# Patient Record
Sex: MALE | Race: WHITE | URBAN - METROPOLITAN AREA
[De-identification: names, ages, dates, MRNs, and addresses within clinical notes are randomized per-mention and may not be internally consistent; named-entity substitution may affect disease eponyms.]

---

## 2021-03-25 ENCOUNTER — IMMUNIZATION (OUTPATIENT)
Dept: PRIMARY CARE CLINIC | Age: 46
End: 2021-03-25
Payer: COMMERCIAL

## 2021-03-25 PROCEDURE — 91301 COVID-19, MODERNA VACCINE 100MCG/0.5ML DOSE: CPT | Performed by: FAMILY MEDICINE

## 2021-03-25 PROCEDURE — 0011A COVID-19, MODERNA VACCINE 100MCG/0.5ML DOSE: CPT | Performed by: FAMILY MEDICINE

## 2021-04-22 ENCOUNTER — IMMUNIZATION (OUTPATIENT)
Dept: PRIMARY CARE CLINIC | Age: 46
End: 2021-04-22
Payer: COMMERCIAL

## 2021-04-22 PROCEDURE — 91301 COVID-19, MODERNA VACCINE 100MCG/0.5ML DOSE: CPT | Performed by: FAMILY MEDICINE

## 2021-04-22 PROCEDURE — 0012A PR IMM ADMN SARSCOV2 100 MCG/0.5 ML 2ND DOSE: CPT | Performed by: FAMILY MEDICINE

## 2024-07-06 ENCOUNTER — HOSPITAL ENCOUNTER (INPATIENT)
Age: 49
LOS: 2 days | Discharge: HOME | DRG: 322 | End: 2024-07-08
Attending: EMERGENCY MEDICINE | Admitting: INTERNAL MEDICINE
Payer: COMMERCIAL

## 2024-07-06 ENCOUNTER — APPOINTMENT (OUTPATIENT)
Dept: GENERAL RADIOLOGY | Age: 49
DRG: 322 | End: 2024-07-06
Payer: COMMERCIAL

## 2024-07-06 DIAGNOSIS — I21.3 ST ELEVATION MYOCARDIAL INFARCTION (STEMI), UNSPECIFIED ARTERY (HCC): Primary | ICD-10-CM

## 2024-07-06 DIAGNOSIS — I24.9 ACUTE CORONARY SYNDROME (HCC): ICD-10-CM

## 2024-07-06 DIAGNOSIS — I21.3 STEMI (ST ELEVATION MYOCARDIAL INFARCTION) (HCC): ICD-10-CM

## 2024-07-06 LAB
ALBUMIN SERPL-MCNC: 4.2 G/DL (ref 3.4–5)
ALBUMIN/GLOB SERPL: 1.3 {RATIO} (ref 1.1–2.2)
ALP SERPL-CCNC: 69 U/L (ref 40–129)
ALT SERPL-CCNC: 37 U/L (ref 10–40)
ANION GAP SERPL CALCULATED.3IONS-SCNC: 12 MMOL/L (ref 3–16)
AST SERPL-CCNC: 22 U/L (ref 15–37)
BASOPHILS # BLD: 0.1 K/UL (ref 0–0.2)
BASOPHILS NFR BLD: 1 %
BILIRUB SERPL-MCNC: 0.3 MG/DL (ref 0–1)
BUN SERPL-MCNC: 18 MG/DL (ref 7–20)
CALCIUM SERPL-MCNC: 9 MG/DL (ref 8.3–10.6)
CHLORIDE SERPL-SCNC: 104 MMOL/L (ref 99–110)
CO2 SERPL-SCNC: 22 MMOL/L (ref 21–32)
CREAT SERPL-MCNC: 0.9 MG/DL (ref 0.9–1.3)
DEPRECATED RDW RBC AUTO: 13.5 % (ref 12.4–15.4)
ECHO BSA: 2.38 M2
EOSINOPHIL # BLD: 0.2 K/UL (ref 0–0.6)
EOSINOPHIL NFR BLD: 1.9 %
GFR SERPLBLD CREATININE-BSD FMLA CKD-EPI: >90 ML/MIN/{1.73_M2}
GLUCOSE SERPL-MCNC: 105 MG/DL (ref 70–99)
HCT VFR BLD AUTO: 45.7 % (ref 40.5–52.5)
HGB BLD-MCNC: 15.5 G/DL (ref 13.5–17.5)
LYMPHOCYTES # BLD: 2.8 K/UL (ref 1–5.1)
LYMPHOCYTES NFR BLD: 27.9 %
MCH RBC QN AUTO: 28.9 PG (ref 26–34)
MCHC RBC AUTO-ENTMCNC: 33.8 G/DL (ref 31–36)
MCV RBC AUTO: 85.5 FL (ref 80–100)
MONOCYTES # BLD: 0.8 K/UL (ref 0–1.3)
MONOCYTES NFR BLD: 7.7 %
NEUTROPHILS # BLD: 6.2 K/UL (ref 1.7–7.7)
NEUTROPHILS NFR BLD: 61.5 %
NT-PROBNP SERPL-MCNC: <36 PG/ML (ref 0–124)
PLATELET # BLD AUTO: 179 K/UL (ref 135–450)
PMV BLD AUTO: 10.3 FL (ref 5–10.5)
POC ACT LR: 193 SEC
POC ACT LR: 207 SEC
POTASSIUM SERPL-SCNC: 4.6 MMOL/L (ref 3.5–5.1)
PROT SERPL-MCNC: 7.4 G/DL (ref 6.4–8.2)
RBC # BLD AUTO: 5.35 M/UL (ref 4.2–5.9)
SODIUM SERPL-SCNC: 138 MMOL/L (ref 136–145)
TROPONIN, HIGH SENSITIVITY: <6 NG/L (ref 0–22)
WBC # BLD AUTO: 10.1 K/UL (ref 4–11)

## 2024-07-06 PROCEDURE — 6360000004 HC RX CONTRAST MEDICATION: Performed by: STUDENT IN AN ORGANIZED HEALTH CARE EDUCATION/TRAINING PROGRAM

## 2024-07-06 PROCEDURE — C1894 INTRO/SHEATH, NON-LASER: HCPCS | Performed by: STUDENT IN AN ORGANIZED HEALTH CARE EDUCATION/TRAINING PROGRAM

## 2024-07-06 PROCEDURE — 93458 L HRT ARTERY/VENTRICLE ANGIO: CPT | Performed by: STUDENT IN AN ORGANIZED HEALTH CARE EDUCATION/TRAINING PROGRAM

## 2024-07-06 PROCEDURE — 027036Z DILATION OF CORONARY ARTERY, ONE ARTERY WITH THREE DRUG-ELUTING INTRALUMINAL DEVICES, PERCUTANEOUS APPROACH: ICD-10-PCS | Performed by: STUDENT IN AN ORGANIZED HEALTH CARE EDUCATION/TRAINING PROGRAM

## 2024-07-06 PROCEDURE — 96374 THER/PROPH/DIAG INJ IV PUSH: CPT

## 2024-07-06 PROCEDURE — 80061 LIPID PANEL: CPT

## 2024-07-06 PROCEDURE — 2000000000 HC ICU R&B

## 2024-07-06 PROCEDURE — 2500000003 HC RX 250 WO HCPCS: Performed by: STUDENT IN AN ORGANIZED HEALTH CARE EDUCATION/TRAINING PROGRAM

## 2024-07-06 PROCEDURE — 99152 MOD SED SAME PHYS/QHP 5/>YRS: CPT | Performed by: STUDENT IN AN ORGANIZED HEALTH CARE EDUCATION/TRAINING PROGRAM

## 2024-07-06 PROCEDURE — 83880 ASSAY OF NATRIURETIC PEPTIDE: CPT

## 2024-07-06 PROCEDURE — 71045 X-RAY EXAM CHEST 1 VIEW: CPT

## 2024-07-06 PROCEDURE — 6360000002 HC RX W HCPCS: Performed by: STUDENT IN AN ORGANIZED HEALTH CARE EDUCATION/TRAINING PROGRAM

## 2024-07-06 PROCEDURE — 6360000002 HC RX W HCPCS

## 2024-07-06 PROCEDURE — 99285 EMERGENCY DEPT VISIT HI MDM: CPT

## 2024-07-06 PROCEDURE — 36415 COLL VENOUS BLD VENIPUNCTURE: CPT

## 2024-07-06 PROCEDURE — C1725 CATH, TRANSLUMIN NON-LASER: HCPCS | Performed by: STUDENT IN AN ORGANIZED HEALTH CARE EDUCATION/TRAINING PROGRAM

## 2024-07-06 PROCEDURE — 96375 TX/PRO/DX INJ NEW DRUG ADDON: CPT

## 2024-07-06 PROCEDURE — 6370000000 HC RX 637 (ALT 250 FOR IP): Performed by: STUDENT IN AN ORGANIZED HEALTH CARE EDUCATION/TRAINING PROGRAM

## 2024-07-06 PROCEDURE — 85025 COMPLETE CBC W/AUTO DIFF WBC: CPT

## 2024-07-06 PROCEDURE — 80053 COMPREHEN METABOLIC PANEL: CPT

## 2024-07-06 PROCEDURE — B2151ZZ FLUOROSCOPY OF LEFT HEART USING LOW OSMOLAR CONTRAST: ICD-10-PCS | Performed by: STUDENT IN AN ORGANIZED HEALTH CARE EDUCATION/TRAINING PROGRAM

## 2024-07-06 PROCEDURE — 84484 ASSAY OF TROPONIN QUANT: CPT

## 2024-07-06 PROCEDURE — C9606 PERC D-E COR REVASC W AMI S: HCPCS | Performed by: STUDENT IN AN ORGANIZED HEALTH CARE EDUCATION/TRAINING PROGRAM

## 2024-07-06 PROCEDURE — 3E033XZ INTRODUCTION OF VASOPRESSOR INTO PERIPHERAL VEIN, PERCUTANEOUS APPROACH: ICD-10-PCS | Performed by: STUDENT IN AN ORGANIZED HEALTH CARE EDUCATION/TRAINING PROGRAM

## 2024-07-06 PROCEDURE — 99153 MOD SED SAME PHYS/QHP EA: CPT | Performed by: STUDENT IN AN ORGANIZED HEALTH CARE EDUCATION/TRAINING PROGRAM

## 2024-07-06 PROCEDURE — C1769 GUIDE WIRE: HCPCS | Performed by: STUDENT IN AN ORGANIZED HEALTH CARE EDUCATION/TRAINING PROGRAM

## 2024-07-06 PROCEDURE — 6360000002 HC RX W HCPCS: Performed by: EMERGENCY MEDICINE

## 2024-07-06 PROCEDURE — 2580000003 HC RX 258: Performed by: NURSE PRACTITIONER

## 2024-07-06 PROCEDURE — 2580000003 HC RX 258: Performed by: STUDENT IN AN ORGANIZED HEALTH CARE EDUCATION/TRAINING PROGRAM

## 2024-07-06 PROCEDURE — 2580000003 HC RX 258

## 2024-07-06 PROCEDURE — 2709999900 HC NON-CHARGEABLE SUPPLY: Performed by: STUDENT IN AN ORGANIZED HEALTH CARE EDUCATION/TRAINING PROGRAM

## 2024-07-06 PROCEDURE — 93005 ELECTROCARDIOGRAM TRACING: CPT | Performed by: EMERGENCY MEDICINE

## 2024-07-06 PROCEDURE — 85347 COAGULATION TIME ACTIVATED: CPT

## 2024-07-06 PROCEDURE — 83036 HEMOGLOBIN GLYCOSYLATED A1C: CPT

## 2024-07-06 PROCEDURE — C1874 STENT, COATED/COV W/DEL SYS: HCPCS | Performed by: STUDENT IN AN ORGANIZED HEALTH CARE EDUCATION/TRAINING PROGRAM

## 2024-07-06 PROCEDURE — C1887 CATHETER, GUIDING: HCPCS | Performed by: STUDENT IN AN ORGANIZED HEALTH CARE EDUCATION/TRAINING PROGRAM

## 2024-07-06 PROCEDURE — 6360000002 HC RX W HCPCS: Performed by: NURSE PRACTITIONER

## 2024-07-06 PROCEDURE — 6370000000 HC RX 637 (ALT 250 FOR IP): Performed by: EMERGENCY MEDICINE

## 2024-07-06 PROCEDURE — 92941 PRQ TRLML REVSC TOT OCCL AMI: CPT | Performed by: STUDENT IN AN ORGANIZED HEALTH CARE EDUCATION/TRAINING PROGRAM

## 2024-07-06 PROCEDURE — 4A023N7 MEASUREMENT OF CARDIAC SAMPLING AND PRESSURE, LEFT HEART, PERCUTANEOUS APPROACH: ICD-10-PCS | Performed by: STUDENT IN AN ORGANIZED HEALTH CARE EDUCATION/TRAINING PROGRAM

## 2024-07-06 PROCEDURE — 99222 1ST HOSP IP/OBS MODERATE 55: CPT | Performed by: STUDENT IN AN ORGANIZED HEALTH CARE EDUCATION/TRAINING PROGRAM

## 2024-07-06 PROCEDURE — 2500000003 HC RX 250 WO HCPCS

## 2024-07-06 PROCEDURE — B2111ZZ FLUOROSCOPY OF MULTIPLE CORONARY ARTERIES USING LOW OSMOLAR CONTRAST: ICD-10-PCS | Performed by: STUDENT IN AN ORGANIZED HEALTH CARE EDUCATION/TRAINING PROGRAM

## 2024-07-06 DEVICE — STENT ONYXNG30038UX ONYX 3.00X38RX
Type: IMPLANTABLE DEVICE | Status: FUNCTIONAL
Brand: ONYX FRONTIER™

## 2024-07-06 DEVICE — STENT ONYXNG35038UX ONYX 3.50X38RX
Type: IMPLANTABLE DEVICE | Status: FUNCTIONAL
Brand: ONYX FRONTIER™

## 2024-07-06 DEVICE — STENT ONYXNG27538UX ONYX 2.75X38RX
Type: IMPLANTABLE DEVICE | Status: FUNCTIONAL
Brand: ONYX FRONTIER™

## 2024-07-06 RX ORDER — ATROPINE SULFATE 0.1 MG/ML
INJECTION INTRAVENOUS PRN
Status: DISCONTINUED | OUTPATIENT
Start: 2024-07-06 | End: 2024-07-06 | Stop reason: HOSPADM

## 2024-07-06 RX ORDER — ONDANSETRON 2 MG/ML
4 INJECTION INTRAMUSCULAR; INTRAVENOUS EVERY 6 HOURS PRN
Status: DISCONTINUED | OUTPATIENT
Start: 2024-07-06 | End: 2024-07-08 | Stop reason: HOSPADM

## 2024-07-06 RX ORDER — ROSUVASTATIN CALCIUM 10 MG/1
20 TABLET, COATED ORAL NIGHTLY
Status: DISCONTINUED | OUTPATIENT
Start: 2024-07-06 | End: 2024-07-08 | Stop reason: HOSPADM

## 2024-07-06 RX ORDER — MAGNESIUM SULFATE IN WATER 40 MG/ML
2000 INJECTION, SOLUTION INTRAVENOUS PRN
Status: DISCONTINUED | OUTPATIENT
Start: 2024-07-06 | End: 2024-07-08 | Stop reason: HOSPADM

## 2024-07-06 RX ORDER — ASPIRIN 81 MG/1
81 TABLET, CHEWABLE ORAL DAILY
Status: DISCONTINUED | OUTPATIENT
Start: 2024-07-06 | End: 2024-07-08 | Stop reason: HOSPADM

## 2024-07-06 RX ORDER — SODIUM CHLORIDE 9 MG/ML
INJECTION, SOLUTION INTRAVENOUS CONTINUOUS
Status: ACTIVE | OUTPATIENT
Start: 2024-07-06 | End: 2024-07-07

## 2024-07-06 RX ORDER — ENOXAPARIN SODIUM 100 MG/ML
30 INJECTION SUBCUTANEOUS 2 TIMES DAILY
Status: DISCONTINUED | OUTPATIENT
Start: 2024-07-06 | End: 2024-07-08 | Stop reason: HOSPADM

## 2024-07-06 RX ORDER — METOPROLOL SUCCINATE 25 MG/1
25 TABLET, EXTENDED RELEASE ORAL DAILY
Status: DISCONTINUED | OUTPATIENT
Start: 2024-07-06 | End: 2024-07-08 | Stop reason: HOSPADM

## 2024-07-06 RX ORDER — SODIUM CHLORIDE 0.9 % (FLUSH) 0.9 %
5-40 SYRINGE (ML) INJECTION PRN
Status: DISCONTINUED | OUTPATIENT
Start: 2024-07-06 | End: 2024-07-08

## 2024-07-06 RX ORDER — SODIUM CHLORIDE 0.9 % (FLUSH) 0.9 %
5-40 SYRINGE (ML) INJECTION EVERY 12 HOURS SCHEDULED
Status: DISCONTINUED | OUTPATIENT
Start: 2024-07-06 | End: 2024-07-08 | Stop reason: HOSPADM

## 2024-07-06 RX ORDER — SODIUM CHLORIDE 9 MG/ML
INJECTION, SOLUTION INTRAVENOUS PRN
Status: DISCONTINUED | OUTPATIENT
Start: 2024-07-06 | End: 2024-07-08 | Stop reason: HOSPADM

## 2024-07-06 RX ORDER — POLYETHYLENE GLYCOL 3350 17 G/17G
17 POWDER, FOR SOLUTION ORAL DAILY PRN
Status: DISCONTINUED | OUTPATIENT
Start: 2024-07-06 | End: 2024-07-08 | Stop reason: HOSPADM

## 2024-07-06 RX ORDER — POTASSIUM CHLORIDE 7.45 MG/ML
10 INJECTION INTRAVENOUS PRN
Status: DISCONTINUED | OUTPATIENT
Start: 2024-07-06 | End: 2024-07-08 | Stop reason: HOSPADM

## 2024-07-06 RX ORDER — NITROGLYCERIN 20 MG/100ML
INJECTION INTRAVENOUS CONTINUOUS PRN
Status: COMPLETED | OUTPATIENT
Start: 2024-07-06 | End: 2024-07-06

## 2024-07-06 RX ORDER — ACETAMINOPHEN 325 MG/1
650 TABLET ORAL EVERY 6 HOURS PRN
Status: DISCONTINUED | OUTPATIENT
Start: 2024-07-06 | End: 2024-07-08

## 2024-07-06 RX ORDER — VALSARTAN 80 MG/1
40 TABLET ORAL DAILY
Status: DISCONTINUED | OUTPATIENT
Start: 2024-07-07 | End: 2024-07-08

## 2024-07-06 RX ORDER — ACETAMINOPHEN 650 MG/1
650 SUPPOSITORY RECTAL EVERY 6 HOURS PRN
Status: DISCONTINUED | OUTPATIENT
Start: 2024-07-06 | End: 2024-07-08

## 2024-07-06 RX ORDER — MORPHINE SULFATE 4 MG/ML
4 INJECTION, SOLUTION INTRAMUSCULAR; INTRAVENOUS ONCE
Status: COMPLETED | OUTPATIENT
Start: 2024-07-06 | End: 2024-07-06

## 2024-07-06 RX ORDER — SODIUM CHLORIDE 9 MG/ML
INJECTION, SOLUTION INTRAVENOUS PRN
Status: DISCONTINUED | OUTPATIENT
Start: 2024-07-06 | End: 2024-07-08

## 2024-07-06 RX ORDER — POTASSIUM CHLORIDE 20 MEQ/1
40 TABLET, EXTENDED RELEASE ORAL PRN
Status: DISCONTINUED | OUTPATIENT
Start: 2024-07-06 | End: 2024-07-08 | Stop reason: HOSPADM

## 2024-07-06 RX ORDER — ACETAMINOPHEN 325 MG/1
650 TABLET ORAL EVERY 4 HOURS PRN
Status: DISCONTINUED | OUTPATIENT
Start: 2024-07-06 | End: 2024-07-08 | Stop reason: HOSPADM

## 2024-07-06 RX ORDER — MIDAZOLAM HYDROCHLORIDE 1 MG/ML
INJECTION INTRAMUSCULAR; INTRAVENOUS PRN
Status: DISCONTINUED | OUTPATIENT
Start: 2024-07-06 | End: 2024-07-06 | Stop reason: HOSPADM

## 2024-07-06 RX ORDER — VERAPAMIL HYDROCHLORIDE 2.5 MG/ML
INJECTION, SOLUTION INTRAVENOUS PRN
Status: DISCONTINUED | OUTPATIENT
Start: 2024-07-06 | End: 2024-07-06 | Stop reason: HOSPADM

## 2024-07-06 RX ORDER — SODIUM CHLORIDE 0.9 % (FLUSH) 0.9 %
5-40 SYRINGE (ML) INJECTION PRN
Status: DISCONTINUED | OUTPATIENT
Start: 2024-07-06 | End: 2024-07-08 | Stop reason: HOSPADM

## 2024-07-06 RX ORDER — HEPARIN SODIUM 1000 [USP'U]/ML
4000 INJECTION, SOLUTION INTRAVENOUS; SUBCUTANEOUS ONCE
Status: COMPLETED | OUTPATIENT
Start: 2024-07-06 | End: 2024-07-06

## 2024-07-06 RX ORDER — HEPARIN SODIUM 1000 [USP'U]/ML
INJECTION, SOLUTION INTRAVENOUS; SUBCUTANEOUS PRN
Status: DISCONTINUED | OUTPATIENT
Start: 2024-07-06 | End: 2024-07-06 | Stop reason: HOSPADM

## 2024-07-06 RX ORDER — SODIUM CHLORIDE 0.9 % (FLUSH) 0.9 %
5-40 SYRINGE (ML) INJECTION EVERY 12 HOURS SCHEDULED
Status: DISCONTINUED | OUTPATIENT
Start: 2024-07-06 | End: 2024-07-08

## 2024-07-06 RX ORDER — ONDANSETRON 4 MG/1
4 TABLET, ORALLY DISINTEGRATING ORAL EVERY 8 HOURS PRN
Status: DISCONTINUED | OUTPATIENT
Start: 2024-07-06 | End: 2024-07-08 | Stop reason: HOSPADM

## 2024-07-06 RX ORDER — FENTANYL CITRATE 50 UG/ML
INJECTION, SOLUTION INTRAMUSCULAR; INTRAVENOUS PRN
Status: DISCONTINUED | OUTPATIENT
Start: 2024-07-06 | End: 2024-07-06 | Stop reason: HOSPADM

## 2024-07-06 RX ADMIN — TICAGRELOR 180 MG: 90 TABLET ORAL at 18:31

## 2024-07-06 RX ADMIN — TICAGRELOR 90 MG: 90 TABLET ORAL at 21:49

## 2024-07-06 RX ADMIN — HEPARIN SODIUM 4000 UNITS: 1000 INJECTION INTRAVENOUS; SUBCUTANEOUS at 18:28

## 2024-07-06 RX ADMIN — ROSUVASTATIN 20 MG: 10 TABLET, FILM COATED ORAL at 21:49

## 2024-07-06 RX ADMIN — ASPIRIN 81 MG 81 MG: 81 TABLET ORAL at 21:49

## 2024-07-06 RX ADMIN — MORPHINE SULFATE 4 MG: 4 INJECTION, SOLUTION INTRAMUSCULAR; INTRAVENOUS at 18:27

## 2024-07-06 RX ADMIN — ENOXAPARIN SODIUM 30 MG: 100 INJECTION SUBCUTANEOUS at 21:49

## 2024-07-06 RX ADMIN — SODIUM CHLORIDE, PRESERVATIVE FREE 10 ML: 5 INJECTION INTRAVENOUS at 22:01

## 2024-07-06 RX ADMIN — METOPROLOL SUCCINATE 25 MG: 25 TABLET, EXTENDED RELEASE ORAL at 21:49

## 2024-07-06 RX ADMIN — SODIUM CHLORIDE: 9 INJECTION, SOLUTION INTRAVENOUS at 19:50

## 2024-07-06 RX ADMIN — SODIUM CHLORIDE: 9 INJECTION, SOLUTION INTRAVENOUS at 23:19

## 2024-07-06 ASSESSMENT — PAIN SCALES - GENERAL: PAINLEVEL_OUTOF10: 0

## 2024-07-06 NOTE — CONSULTS
[facility] HEART [place]    2024    Arthur Klein (:  1975) is a 48 y.o. male    Referring Provider: No primary care provider on file.    HISTORY:  48M With no reported past medical history and has felt really quite well is admitted with field activated STEMI.  Has had no prior chest pain or any warning symptoms over the past few days.  He is working describes today when he felt the acute onset of chest discomfort and pressure.  This was associated with radiation to his shoulders.He subsequently got quite diaphoretic and the pain persisted and therefore he called EMS.  EMS arrived and noted inferior ST elevations with lateral depressions and field activated STEMI was called.  He was seen in the emergency room where chest pain persist.  He has been given aspirin and ticagrelor.  Currently rates it 2-4 out of 10 and improved after morphine.    Discussed emergent left heart catheterization for which she is ready to proceed.      REVIEW OF SYSTEMS:  Review of systems was deferred due to acuity of present illness    Prior to Visit Medications    Not on File        No Known Allergies    No past medical history on file.    No past surgical history on file.    Social History     Tobacco Use    Smoking status: Never    Smokeless tobacco: Never   Substance Use Topics    Alcohol use: Yes     Comment: socially        No family history on file.    PHYSICAL EXAMINATION:  Vitals:    24 1819 24 1826 24 1834   BP:  (!) 147/98    Pulse:  58    Resp:  21    Temp:   97.8 °F (36.6 °C)   TempSrc:   Axillary   SpO2:  97%    Weight: 109.8 kg (242 lb)     Height: 1.854 m (6' 1\")       Estimated body mass index is 31.93 kg/m² as calculated from the following:    Height as of this encounter: 1.854 m (6' 1\").    Weight as of this encounter: 109.8 kg (242 lb).    General Appearance: Uncomfortable  Eyes:  Conjunctiva clear  Pupils equal, round, reactive to light  ENT:  External Ears and Nose unremarkable  Oral mucosa

## 2024-07-06 NOTE — SEDATION DOCUMENTATION
Brief Pre-Op Note/Sedation Assessment      Arthur Klein  1975  [id number]  6:41 PM    Planned Procedure: Cardiac Catheterization Procedure  Post Procedure Plan: Return to same level of care  Consent: I have discussed with the patient and/or the patient representative the indication, alternatives, and the possible risks and/or complications of the planned procedure and the anesthesia methods. The patient and/or patient representative appear to understand and agree to proceed.        Chief Complaint:   Chest Pain/Pressure  STEMI      Indications for Cath Procedure:  Presentation:  ACS <= 24 hrs  2.  Anginal Classification within 2 weeks:  CCS IV - Inability to perform any activity without angina or angina at rest, i.e., severe limitation  3.  Angina Symptoms Assessment:  Typical Chest Pain  4.  Heart Failure Class within last 2 weeks:  No symptoms  5.  Cardiovascular Instability:  No    Prior Ischemic Workup/Eval:  Pre-Procedural Medications: ASA/Ticagrelor  2.   Stress Test Completed?  No    Does Patient need surgery?  Cath Valve Surgery:  No    Pre-Procedure Medical History:  Vital Signs:  BP (!) 147/98   Pulse 58   Temp 97.8 °F (36.6 °C) (Axillary)   Resp 21   Ht 1.854 m (6' 1\")   Wt 109.8 kg (242 lb)   SpO2 97%   BMI 31.93 kg/m²     Allergies:  No Known Allergies  Medications:    No current facility-administered medications for this encounter.     No current outpatient medications on file.       Past Medical History:  No past medical history on file.    Surgical History:  No past surgical history on file.          Pre-Sedation:  Pre-Sedation Documentation and Exam:  I have personally completed a history, physical exam & review of systems for this patient (see notes).    Prior History of Anesthesia Complications:   none    Modified Mallampati:  III (soft palate, base of uvula visible)    ASA Classification:  Class 3 - A patient with severe systemic disease that limits activity but is not

## 2024-07-06 NOTE — ED NOTES
Code Stemi  Prior pt getting here,   @1755 Called cardio   @1755 Called clinical and spoke to Suzi to call in Cath lab  @1757 Andi Villanueva cardio called back told him the pt wasn't here yet  @1814 Andi Villanueva called and said he was on his way, told him the pt just arrived  @1815 called clinical and told Suzi pt just arrived   @1817 Cath lab called and said they were on the way  @1823 Andi called back and spoke to , cardio will be here in 5 to 10min

## 2024-07-06 NOTE — ED PROVIDER NOTES
EMERGENCY DEPARTMENT ENCOUNTER     [facility] CATH LAB     Pt Name: Arthur Klein   MRN: [id number]   Birthdate 1975   Date of evaluation: 7/6/2024   Provider: Napoleon Hernández MD   PCP: No primary care provider on file.   Note Started: 8:09 PM EDT 7/6/24     CHIEF COMPLAINT     Chief Complaint   Patient presents with    Chest Pain     Was working in garage around 1720 and started having left sided chest pain/pressure with left arm numbness/tingling in both arms. Pt appears clammy upon arrival, states 2/10 chest pain currently. Denies cardiac hx. EMS gave 324 ASA prior to arrival         HISTORY OF PRESENT ILLNESS:  History from : Patient   Limitations to history : None     Arthur Klein is a 48 y.o. male who presents for chest pain.  Patient reports he was working in the garage when he became sweaty and had tingling in both arms and felt a chest pain that is about 4 out of 10 pressure in the mid chest.  After 10 or 15 minutes and the symptoms did not resolve he called EMS.  EMS EKG revealed ST elevation MI and they transported him here and gave aspirin en route.  Patient received 324 mg of aspirin prior to arrival.  No prior cardiac history or any significant medical problems.  Patient denies any family history of early heart disease.  Pain is improved to 2 out of 10 by the time of arrival here.    Nursing Notes were all reviewed and agreed with or any disagreements were addressed in the HPI.     ROS: Positives and Pertinent negatives as per HPI.    PAST MEDICAL HISTORY     Past medical history:  has no past medical history on file.    Past surgical history:  has no past surgical history on file.    Med list:   No current facility-administered medications on file prior to encounter.     No current outpatient medications on file prior to encounter.       PHYSICAL EXAM:  ED Triage Vitals   BP Temp Temp Source Pulse Respirations SpO2 Height Weight - Scale   07/06/24 1826 07/06/24 1834 07/06/24

## 2024-07-07 LAB
ALBUMIN SERPL-MCNC: 3.5 G/DL (ref 3.4–5)
ALBUMIN/GLOB SERPL: 1.4 {RATIO} (ref 1.1–2.2)
ALP SERPL-CCNC: 59 U/L (ref 40–129)
ALT SERPL-CCNC: 32 U/L (ref 10–40)
ANION GAP SERPL CALCULATED.3IONS-SCNC: 8 MMOL/L (ref 3–16)
ANION GAP SERPL CALCULATED.3IONS-SCNC: 9 MMOL/L (ref 3–16)
AST SERPL-CCNC: 45 U/L (ref 15–37)
BASOPHILS # BLD: 0.1 K/UL (ref 0–0.2)
BASOPHILS NFR BLD: 0.8 %
BILIRUB SERPL-MCNC: 0.3 MG/DL (ref 0–1)
BUN SERPL-MCNC: 14 MG/DL (ref 7–20)
BUN SERPL-MCNC: 14 MG/DL (ref 7–20)
CALCIUM SERPL-MCNC: 7.9 MG/DL (ref 8.3–10.6)
CALCIUM SERPL-MCNC: 7.9 MG/DL (ref 8.3–10.6)
CHLORIDE SERPL-SCNC: 106 MMOL/L (ref 99–110)
CHLORIDE SERPL-SCNC: 107 MMOL/L (ref 99–110)
CHOLEST SERPL-MCNC: 198 MG/DL (ref 0–199)
CO2 SERPL-SCNC: 23 MMOL/L (ref 21–32)
CO2 SERPL-SCNC: 24 MMOL/L (ref 21–32)
CREAT SERPL-MCNC: 0.6 MG/DL (ref 0.9–1.3)
CREAT SERPL-MCNC: 0.8 MG/DL (ref 0.9–1.3)
DEPRECATED RDW RBC AUTO: 13.4 % (ref 12.4–15.4)
EKG ATRIAL RATE: 61 BPM
EKG ATRIAL RATE: 68 BPM
EKG DIAGNOSIS: NORMAL
EKG DIAGNOSIS: NORMAL
EKG P AXIS: -10 DEGREES
EKG P AXIS: 29 DEGREES
EKG P-R INTERVAL: 142 MS
EKG P-R INTERVAL: 150 MS
EKG Q-T INTERVAL: 396 MS
EKG Q-T INTERVAL: 400 MS
EKG QRS DURATION: 92 MS
EKG QRS DURATION: 98 MS
EKG QTC CALCULATION (BAZETT): 402 MS
EKG QTC CALCULATION (BAZETT): 421 MS
EKG R AXIS: -6 DEGREES
EKG R AXIS: 28 DEGREES
EKG T AXIS: -22 DEGREES
EKG T AXIS: 64 DEGREES
EKG VENTRICULAR RATE: 61 BPM
EKG VENTRICULAR RATE: 68 BPM
EOSINOPHIL # BLD: 0.1 K/UL (ref 0–0.6)
EOSINOPHIL NFR BLD: 0.9 %
EST. AVERAGE GLUCOSE BLD GHB EST-MCNC: 99.7 MG/DL
GFR SERPLBLD CREATININE-BSD FMLA CKD-EPI: >90 ML/MIN/{1.73_M2}
GFR SERPLBLD CREATININE-BSD FMLA CKD-EPI: >90 ML/MIN/{1.73_M2}
GLUCOSE SERPL-MCNC: 105 MG/DL (ref 70–99)
GLUCOSE SERPL-MCNC: 118 MG/DL (ref 70–99)
HBA1C MFR BLD: 5.1 %
HCT VFR BLD AUTO: 39.7 % (ref 40.5–52.5)
HDLC SERPL-MCNC: 37 MG/DL (ref 40–60)
HGB BLD-MCNC: 13.7 G/DL (ref 13.5–17.5)
LDLC SERPL CALC-MCNC: 116 MG/DL
LYMPHOCYTES # BLD: 1.7 K/UL (ref 1–5.1)
LYMPHOCYTES NFR BLD: 20.3 %
MCH RBC QN AUTO: 29.8 PG (ref 26–34)
MCHC RBC AUTO-ENTMCNC: 34.7 G/DL (ref 31–36)
MCV RBC AUTO: 85.9 FL (ref 80–100)
MONOCYTES # BLD: 0.5 K/UL (ref 0–1.3)
MONOCYTES NFR BLD: 6.4 %
NEUTROPHILS # BLD: 5.8 K/UL (ref 1.7–7.7)
NEUTROPHILS NFR BLD: 71.6 %
PLATELET # BLD AUTO: 146 K/UL (ref 135–450)
PMV BLD AUTO: 9.8 FL (ref 5–10.5)
POTASSIUM SERPL-SCNC: 3.9 MMOL/L (ref 3.5–5.1)
POTASSIUM SERPL-SCNC: 4 MMOL/L (ref 3.5–5.1)
PROT SERPL-MCNC: 6 G/DL (ref 6.4–8.2)
RBC # BLD AUTO: 4.62 M/UL (ref 4.2–5.9)
SODIUM SERPL-SCNC: 138 MMOL/L (ref 136–145)
SODIUM SERPL-SCNC: 139 MMOL/L (ref 136–145)
TRIGL SERPL-MCNC: 223 MG/DL (ref 0–150)
TROPONIN, HIGH SENSITIVITY: 752 NG/L (ref 0–22)
TROPONIN, HIGH SENSITIVITY: 841 NG/L (ref 0–22)
VLDLC SERPL CALC-MCNC: 45 MG/DL
WBC # BLD AUTO: 8.2 K/UL (ref 4–11)

## 2024-07-07 PROCEDURE — 2580000003 HC RX 258: Performed by: NURSE PRACTITIONER

## 2024-07-07 PROCEDURE — 2000000000 HC ICU R&B

## 2024-07-07 PROCEDURE — 36415 COLL VENOUS BLD VENIPUNCTURE: CPT

## 2024-07-07 PROCEDURE — 6360000002 HC RX W HCPCS: Performed by: NURSE PRACTITIONER

## 2024-07-07 PROCEDURE — 84484 ASSAY OF TROPONIN QUANT: CPT

## 2024-07-07 PROCEDURE — 6370000000 HC RX 637 (ALT 250 FOR IP): Performed by: STUDENT IN AN ORGANIZED HEALTH CARE EDUCATION/TRAINING PROGRAM

## 2024-07-07 PROCEDURE — 93005 ELECTROCARDIOGRAM TRACING: CPT | Performed by: STUDENT IN AN ORGANIZED HEALTH CARE EDUCATION/TRAINING PROGRAM

## 2024-07-07 PROCEDURE — 93010 ELECTROCARDIOGRAM REPORT: CPT | Performed by: INTERNAL MEDICINE

## 2024-07-07 PROCEDURE — 99232 SBSQ HOSP IP/OBS MODERATE 35: CPT | Performed by: INTERNAL MEDICINE

## 2024-07-07 PROCEDURE — 85025 COMPLETE CBC W/AUTO DIFF WBC: CPT

## 2024-07-07 PROCEDURE — 80053 COMPREHEN METABOLIC PANEL: CPT

## 2024-07-07 RX ADMIN — ROSUVASTATIN 20 MG: 10 TABLET, FILM COATED ORAL at 20:57

## 2024-07-07 RX ADMIN — TICAGRELOR 90 MG: 90 TABLET ORAL at 20:57

## 2024-07-07 RX ADMIN — ENOXAPARIN SODIUM 30 MG: 100 INJECTION SUBCUTANEOUS at 09:14

## 2024-07-07 RX ADMIN — SODIUM CHLORIDE, PRESERVATIVE FREE 10 ML: 5 INJECTION INTRAVENOUS at 20:00

## 2024-07-07 RX ADMIN — METOPROLOL SUCCINATE 25 MG: 25 TABLET, EXTENDED RELEASE ORAL at 09:18

## 2024-07-07 RX ADMIN — TICAGRELOR 90 MG: 90 TABLET ORAL at 09:18

## 2024-07-07 RX ADMIN — ASPIRIN 81 MG 81 MG: 81 TABLET ORAL at 09:14

## 2024-07-07 RX ADMIN — SODIUM CHLORIDE, PRESERVATIVE FREE 10 ML: 5 INJECTION INTRAVENOUS at 09:18

## 2024-07-07 RX ADMIN — VALSARTAN 40 MG: 80 TABLET, FILM COATED ORAL at 09:14

## 2024-07-07 NOTE — H&P
V2.0  History and Physical      Name:  Arthur Klein /Age/Sex: 1975  (48 y.o. male)   MRN & CSN:  [id number] & [id number] Encounter Date/Time: 2024 8:27 PM EDT   Location:  Cath Pool Room/ PCP: No primary care provider on file.       Hospital Day: 1    Assessment and Plan:   Arthur Klein is a 48 y.o. male with no significant past medical history who presents with STEMI (ST elevation myocardial infarction) ([facility])    Hospital Problems             Last Modified POA    * (Principal) STEMI (ST elevation myocardial infarction) ([facility]) 2024 Unknown       Plan:  STEMI  -pt now s/p PCI  -ASA and tricagrelor load in the ED  -cardiology following  -telemetry monitor  -metoprolol, crestor, valsartan started per cardio  -cont ASA  -lipid panel pending  -Hgb A1C pending  -repeat labs in AM  -cardiac rehab per cardiology  -EKG in AM  -echo in AM    Disposition:   Current Living situation: home   Expected Disposition: home  Estimated D/C: 24    Diet ADULT DIET; Regular; Low Fat/Low Chol/High Fiber/2 gm Na   DVT Prophylaxis [] Lovenox, []  Heparin, [] SCDs, [] Ambulation,  [] Eliquis, [] Xarelto, [] Coumadin   Code Status Full Code   Surrogate Decision Maker/ ARNOLDO Fuentes Kleinbryant     Personally reviewed Lab Studies and Imaging       Drugs that require monitoring for toxicity include ASA, antiplatelets and the method of monitoring was daily CBC        History from:     patient    History of Present Illness:     Chief Complaint: chest pressure  Arthur Klein is a 48 y.o. male with no significant past medical history who presents with STEMI. Activated in the field. Patient reports he had chest pressure associated with bilateral arm tingling and discomfort. Patient was working at the time. No previous history of same. No recent illness including fever, chills, cough, shorntess of breath. Found to be diaphoretic thus EMS called. EMS noted inferios ST elevations with lateral depressions and field activated STEMI was

## 2024-07-07 NOTE — PLAN OF CARE
Problem: Discharge Planning  Goal: Discharge to home or other facility with appropriate resources  7/7/2024 1423 by Delores Duncan RN  Outcome: Progressing     Problem: Cardiovascular - Adult  Goal: Maintains optimal cardiac output and hemodynamic stability  Outcome: Progressing     Problem: Cardiovascular - Adult  Goal: Absence of cardiac dysrhythmias or at baseline  Outcome: Progressing     Problem: Skin/Tissue Integrity - Adult  Goal: Skin integrity remains intact  Outcome: Progressing     Problem: Hematologic - Adult  Goal: Maintains hematologic stability  Outcome: Progressing

## 2024-07-08 ENCOUNTER — APPOINTMENT (OUTPATIENT)
Age: 49
DRG: 322 | End: 2024-07-08
Attending: STUDENT IN AN ORGANIZED HEALTH CARE EDUCATION/TRAINING PROGRAM
Payer: COMMERCIAL

## 2024-07-08 VITALS
DIASTOLIC BLOOD PRESSURE: 92 MMHG | TEMPERATURE: 98.5 F | HEIGHT: 73 IN | WEIGHT: 253 LBS | HEART RATE: 81 BPM | RESPIRATION RATE: 20 BRPM | OXYGEN SATURATION: 98 % | SYSTOLIC BLOOD PRESSURE: 144 MMHG | BODY MASS INDEX: 33.53 KG/M2

## 2024-07-08 LAB
DEPRECATED RDW RBC AUTO: 13.2 % (ref 12.4–15.4)
ECHO AO ASC DIAM: 3.9 CM
ECHO AO ASCENDING AORTA INDEX: 1.64 CM/M2
ECHO AO ROOT DIAM: 3.4 CM
ECHO AO ROOT INDEX: 1.43 CM/M2
ECHO AV MEAN GRADIENT: 4 MMHG
ECHO AV MEAN GRADIENT: 4 MMHG
ECHO AV MEAN VELOCITY: 1 M/S
ECHO AV PEAK GRADIENT: 8 MMHG
ECHO AV PEAK VELOCITY: 1.5 M/S
ECHO AV VELOCITY RATIO: 0.67
ECHO AV VTI: 27.1 CM
ECHO BSA: 2.43 M2
ECHO LA AREA 2C: 17.3 CM2
ECHO LA AREA 4C: 12.3 CM2
ECHO LA DIAMETER INDEX: 1.43 CM/M2
ECHO LA DIAMETER: 3.4 CM
ECHO LA MAJOR AXIS: 4.8 CM
ECHO LA MINOR AXIS: 4.8 CM
ECHO LA TO AORTIC ROOT RATIO: 1
ECHO LA VOL BP: 35 ML (ref 18–58)
ECHO LA VOL MOD A2C: 49 ML (ref 18–58)
ECHO LA VOL MOD A4C: 26 ML (ref 18–58)
ECHO LA VOL/BSA BIPLANE: 15 ML/M2 (ref 16–34)
ECHO LA VOLUME INDEX MOD A2C: 21 ML/M2 (ref 16–34)
ECHO LA VOLUME INDEX MOD A4C: 11 ML/M2 (ref 16–34)
ECHO LV E' LATERAL VELOCITY: 11 CM/S
ECHO LV E' SEPTAL VELOCITY: 8 CM/S
ECHO LV EDV 3D: 154 ML
ECHO LV EDV A2C: 111 ML
ECHO LV EDV A4C: 129 ML
ECHO LV EDV INDEX 3D: 65 ML/M2
ECHO LV EDV INDEX A4C: 54 ML/M2
ECHO LV EDV NDEX A2C: 47 ML/M2
ECHO LV EJECTION FRACTION 3D: 51 %
ECHO LV EJECTION FRACTION A2C: 51 %
ECHO LV EJECTION FRACTION A4C: 52 %
ECHO LV EJECTION FRACTION BIPLANE: 52 % (ref 55–100)
ECHO LV ESV 3D: 75 ML
ECHO LV ESV A2C: 54 ML
ECHO LV ESV A4C: 63 ML
ECHO LV ESV INDEX 3D: 32 ML/M2
ECHO LV ESV INDEX A2C: 23 ML/M2
ECHO LV ESV INDEX A4C: 26 ML/M2
ECHO LV FRACTIONAL SHORTENING: 26 % (ref 28–44)
ECHO LV GLOBAL LONGITUDINAL STRAIN (GLS): -11.9 %
ECHO LV INTERNAL DIMENSION DIASTOLE INDEX: 2.39 CM/M2
ECHO LV INTERNAL DIMENSION DIASTOLIC: 5.7 CM (ref 4.2–5.9)
ECHO LV INTERNAL DIMENSION SYSTOLIC INDEX: 1.76 CM/M2
ECHO LV INTERNAL DIMENSION SYSTOLIC: 4.2 CM
ECHO LV IVSD: 1 CM (ref 0.6–1)
ECHO LV MASS 2D: 197.5 G (ref 88–224)
ECHO LV MASS 3D INDEX: 84.5 G/M2
ECHO LV MASS 3D: 201 G
ECHO LV MASS INDEX 2D: 83 G/M2 (ref 49–115)
ECHO LV POSTERIOR WALL DIASTOLIC: 0.8 CM (ref 0.6–1)
ECHO LV RELATIVE WALL THICKNESS RATIO: 0.28
ECHO LVOT AV VTI INDEX: 0.64
ECHO LVOT MEAN GRADIENT: 2 MMHG
ECHO LVOT PEAK GRADIENT: 4 MMHG
ECHO LVOT PEAK VELOCITY: 1 M/S
ECHO LVOT VTI: 17.4 CM
ECHO MV A VELOCITY: 0.82 M/S
ECHO MV E DECELERATION TIME (DT): 175 MS
ECHO MV E VELOCITY: 0.78 M/S
ECHO MV E/A RATIO: 0.95
ECHO MV E/E' LATERAL: 7.09
ECHO MV E/E' RATIO (AVERAGED): 8.42
ECHO MV E/E' SEPTAL: 9.75
ECHO RA AREA 4C: 13.6 CM2
ECHO RA END SYSTOLIC VOLUME APICAL 4 CHAMBER INDEX BSA: 13 ML/M2
ECHO RA VOLUME: 31 ML
ECHO RV FREE WALL PEAK S': 10 CM/S
ECHO RV TAPSE: 1.2 CM (ref 1.7–?)
HCT VFR BLD AUTO: 42.1 % (ref 40.5–52.5)
HGB BLD-MCNC: 14.8 G/DL (ref 13.5–17.5)
MCH RBC QN AUTO: 29.8 PG (ref 26–34)
MCHC RBC AUTO-ENTMCNC: 35.1 G/DL (ref 31–36)
MCV RBC AUTO: 84.9 FL (ref 80–100)
PLATELET # BLD AUTO: 150 K/UL (ref 135–450)
PMV BLD AUTO: 10.3 FL (ref 5–10.5)
POC ACT LR: >400 SEC
RBC # BLD AUTO: 4.96 M/UL (ref 4.2–5.9)
REASON FOR REJECTION: NORMAL
REJECTED TEST: NORMAL
WBC # BLD AUTO: 7.2 K/UL (ref 4–11)

## 2024-07-08 PROCEDURE — 99232 SBSQ HOSP IP/OBS MODERATE 35: CPT | Performed by: INTERNAL MEDICINE

## 2024-07-08 PROCEDURE — 2580000003 HC RX 258: Performed by: STUDENT IN AN ORGANIZED HEALTH CARE EDUCATION/TRAINING PROGRAM

## 2024-07-08 PROCEDURE — 76376 3D RENDER W/INTRP POSTPROCES: CPT

## 2024-07-08 PROCEDURE — 85027 COMPLETE CBC AUTOMATED: CPT

## 2024-07-08 PROCEDURE — 6370000000 HC RX 637 (ALT 250 FOR IP): Performed by: INTERNAL MEDICINE

## 2024-07-08 PROCEDURE — 6370000000 HC RX 637 (ALT 250 FOR IP): Performed by: STUDENT IN AN ORGANIZED HEALTH CARE EDUCATION/TRAINING PROGRAM

## 2024-07-08 PROCEDURE — 36415 COLL VENOUS BLD VENIPUNCTURE: CPT

## 2024-07-08 PROCEDURE — 93306 TTE W/DOPPLER COMPLETE: CPT | Performed by: INTERNAL MEDICINE

## 2024-07-08 PROCEDURE — 76376 3D RENDER W/INTRP POSTPROCES: CPT | Performed by: INTERNAL MEDICINE

## 2024-07-08 RX ORDER — VALSARTAN 80 MG/1
80 TABLET ORAL DAILY
Qty: 30 TABLET | Refills: 3 | Status: SHIPPED | OUTPATIENT
Start: 2024-07-09

## 2024-07-08 RX ORDER — ROSUVASTATIN CALCIUM 20 MG/1
20 TABLET, COATED ORAL NIGHTLY
Qty: 30 TABLET | Refills: 3 | Status: SHIPPED | OUTPATIENT
Start: 2024-07-08

## 2024-07-08 RX ORDER — ASPIRIN 81 MG/1
81 TABLET, CHEWABLE ORAL DAILY
Qty: 30 TABLET | Refills: 11 | Status: SHIPPED | OUTPATIENT
Start: 2024-07-09

## 2024-07-08 RX ORDER — VALSARTAN 80 MG/1
80 TABLET ORAL DAILY
Status: DISCONTINUED | OUTPATIENT
Start: 2024-07-09 | End: 2024-07-08 | Stop reason: HOSPADM

## 2024-07-08 RX ORDER — VALSARTAN 80 MG/1
40 TABLET ORAL ONCE
Status: COMPLETED | OUTPATIENT
Start: 2024-07-08 | End: 2024-07-08

## 2024-07-08 RX ORDER — METOPROLOL SUCCINATE 25 MG/1
25 TABLET, EXTENDED RELEASE ORAL DAILY
Qty: 30 TABLET | Refills: 3 | Status: SHIPPED | OUTPATIENT
Start: 2024-07-09

## 2024-07-08 RX ADMIN — VALSARTAN 40 MG: 80 TABLET, FILM COATED ORAL at 17:30

## 2024-07-08 RX ADMIN — MUPIROCIN: 20 OINTMENT TOPICAL at 09:08

## 2024-07-08 RX ADMIN — METOPROLOL SUCCINATE 25 MG: 25 TABLET, EXTENDED RELEASE ORAL at 09:08

## 2024-07-08 RX ADMIN — VALSARTAN 40 MG: 80 TABLET, FILM COATED ORAL at 09:08

## 2024-07-08 RX ADMIN — Medication 10 ML: at 09:08

## 2024-07-08 RX ADMIN — ASPIRIN 81 MG 81 MG: 81 TABLET ORAL at 09:08

## 2024-07-08 RX ADMIN — TICAGRELOR 90 MG: 90 TABLET ORAL at 09:07

## 2024-07-08 NOTE — DISCHARGE INSTR - COC
Continuity of Care Form    Patient Name: Arthur Klein   :  1975  MRN:  [id number]    Admit date:  2024  Discharge date:  ***    Code Status Order: Full Code   Advance Directives:     Admitting Physician:  Xenia Torres MD  PCP: No primary care provider on file.    Discharging Nurse: sujey PACE  Discharging Hospital Unit/Room#: 0240/0240-01  Discharging Unit Phone Number: [phone]    Emergency Contact:   Extended Emergency Contact Information  Primary Emergency Contact: ELMER TODD  Address: [address] [place] [id number]  Home Phone: [phone]  Relation: Parent    Past Surgical History:  Past Surgical History:   Procedure Laterality Date    CARDIAC PROCEDURE N/A 2024    Left heart cath / coronary angiography performed by Andi Villanueva MD at [facility] CARDIAC CATH LAB    CARDIAC PROCEDURE N/A 2024    Insert stent kendy coronary performed by Andi Villanueva MD at [facility] CARDIAC CATH LAB       Immunization History:   Immunization History   Administered Date(s) Administered    COVID-19, MODERNA BLUE border, Primary or Immunocompromised, (age 12y+), IM, 100 mcg/0.5mL 2021, 2021       Active Problems:  Patient Active Problem List   Diagnosis Code    STEMI (ST elevation myocardial infarction) ([facility]) I21.3       Isolation/Infection:   Isolation            No Isolation          Patient Infection Status       None to display            Nurse Assessment:  Last Vital Signs: BP (!) 144/92   Pulse 81   Temp 98.5 °F (36.9 °C) (Oral)   Resp 20   Ht 1.854 m (6' 1\")   Wt 114.8 kg (253 lb)   SpO2 98%   BMI 33.38 kg/m²     Last documented pain score (0-10 scale): Pain Level: 0  Last Weight:   Wt Readings from Last 1 Encounters:   24 114.8 kg (253 lb)     Mental Status:  oriented and alert    IV Access:  - None    Nursing Mobility/ADLs:  Walking   Independent  Transfer  Independent  Bathing  Independent  Dressing  Independent  Toileting  Independent  Feeding

## 2024-07-08 NOTE — CARE COORDINATION
Case Management Assessment  Initial Evaluation    Date/Time of Evaluation: 7/8/2024 11:41 AM  Assessment Completed by: Julia Velasquez RN    If patient is discharged prior to next notation, then this note serves as note for discharge by case management.    Patient Name: Arthur Klein                   YOB: 1975  Diagnosis: STEMI (ST elevation myocardial infarction) (HCC) [I21.3]  ST elevation myocardial infarction (STEMI), unspecified artery (HCC) [I21.3]                   Date / Time: 7/6/2024  6:17 PM    Patient Admission Status: Inpatient   Readmission Risk (Low < 19, Mod (19-27), High > 27): Readmission Risk Score: 4.9    Current PCP: No primary care provider on file.  PCP verified by CM? Yes (currently no PSP - retired. Provided PCP list and Resident Clinic info)    Chart Reviewed: Yes      History Provided by: Patient  Patient Orientation: Alert and Oriented    Patient Cognition: Alert    Hospitalization in the last 30 days (Readmission):  No    If yes, Readmission Assessment in CM Navigator will be completed.    Advance Directives:      Code Status: Full Code   Patient's Primary Decision Maker is: Patient Declined (Legal Next of Kin Remains as Decision Maker)    Primary Decision Maker: ELMER TODD - Parent - [phone]    Discharge Planning:    Patient lives with: Spouse/Significant Other Type of Home: House  Primary Care Giver: Self  Patient Support Systems include: Spouse/Significant Other   Current Financial resources: Other (Comment) ([facility])  Current community resources: None  Current services prior to admission: None            Current DME:              Type of Home Care services:  None    ADLS  Prior functional level: Independent in ADLs/IADLs  Current functional level: Independent in ADLs/IADLs    PT AM-PAC:   /24  OT AM-PAC:   /24    Family can provide assistance at DC:    Would you like Case Management to discuss the discharge plan with any other family members/significant

## 2024-07-08 NOTE — CONSULTS
Nutrition Education    RD received consult for diet education. Pt/family requesting heart healthy nutrition education specifically about red meat and sodium. RD provided pt with heart healthy and low sodium nutrition handouts and reviewed educations. Discussed low sodium, whole grains, fruits and vegetables, lean proteins, healthier fat options, and meal planning tips. Pt tracking intakes on myfitness pal. Reports plan to make heart healthy changes by monitoring sodium and reducing frequency of red meat consumption. No further questions.  Will continue to monitor per nutrition standards of care.     Educated on heart healthy and low sodium nutrition therapy  Learners: Patient  Readiness: Acceptance  Method: Explanation and Handout  Response: Verbalizes Understanding  Contact name and number provided.    Lakeshia Cardenas MS RD   Contact Number: Office: [phone]; Ramón: [phone]

## 2024-07-08 NOTE — DISCHARGE SUMMARY
07/06/24  1836 07/07/24  0455   AST 22 45*   ALT 37 32   BILITOT 0.3 0.3   ALKPHOS 69 59     Lipids:   Lab Results   Component Value Date/Time    CHOL 198 07/06/2024 11:42 PM    HDL 37 07/06/2024 11:42 PM    TRIG 223 07/06/2024 11:42 PM     Hemoglobin A1C:   Lab Results   Component Value Date/Time    LABA1C 5.1 07/06/2024 11:42 PM     TSH: No results found for: \"TSH\"  Troponin: No results found for: \"TROPONINT\"  Lactic Acid: No results for input(s): \"LACTA\" in the last 72 hours.  BNP:   Recent Labs     07/06/24 1836   PROBNP <36     UA:No results found for: \"NITRU\", \"COLORU\", \"PHUR\", \"LABCAST\", \"WBCUA\", \"RBCUA\", \"MUCUS\", \"TRICHOMONAS\", \"YEAST\", \"BACTERIA\", \"CLARITYU\", \"SPECGRAV\", \"LEUKOCYTESUR\", \"UROBILINOGEN\", \"BILIRUBINUR\", \"BLOODU\", \"GLUCOSEU\", \"KETUA\", \"AMORPHOUS\"  Urine Cultures: No results found for: \"LABURIN\"  Blood Cultures: No results found for: \"BC\"  No results found for: \"BLOODCULT2\"  Organism: No results found for: \"ORG\"      The patient expressed appropriate understanding of, and agreement with the discharge recommendations, medications, and plan.     Full Code    Discharge condition: stable    Consults this admission:  IP CONSULT TO CARDIAC REHAB  IP CONSULT TO CARDIAC REHAB  IP CONSULT TO DIETITIAN    Time Spent Discharging patient 33 minutes    Electronically signed by PALOMO TENORIO MD on 7/8/2024 at 4:40 PM

## 2024-07-08 NOTE — PROGRESS NOTES
[facility]   Daily Cardiovascular Progress Note    Admit Date: 7/6/2024    Chief complaint:  chest pain  HPI:     Pt denies CP, SOB, dizziness or syncope.  No issues at rt radial site       Medications/Labs all Reviewed:  Patient Active Problem List   Diagnosis    STEMI (ST elevation myocardial infarction) (HCC)       Medications:  mupirocin (BACTROBAN) 2 % ointment, BID  sodium chloride flush 0.9 % injection 5-40 mL, 2 times per day  sodium chloride flush 0.9 % injection 5-40 mL, PRN  acetaminophen (TYLENOL) tablet 650 mg, Q4H PRN  aspirin chewable tablet 81 mg, Daily  ticagrelor (BRILINTA) tablet 90 mg, BID  rosuvastatin (CRESTOR) tablet 20 mg, Nightly  metoprolol succinate (TOPROL XL) extended release tablet 25 mg, Daily  valsartan (DIOVAN) tablet 40 mg, Daily  perflutren lipid microspheres (DEFINITY) injection 1.5 mL, ONCE PRN  0.9 % sodium chloride infusion, PRN  potassium chloride (KLOR-CON M) extended release tablet 40 mEq, PRN   Or  potassium bicarb-citric acid (EFFER-K) effervescent tablet 40 mEq, PRN   Or  potassium chloride 10 mEq/100 mL IVPB (Peripheral Line), PRN  magnesium sulfate 2000 mg in 50 mL IVPB premix, PRN  enoxaparin Sodium (LOVENOX) injection 30 mg, BID  ondansetron (ZOFRAN-ODT) disintegrating tablet 4 mg, Q8H PRN   Or  ondansetron (ZOFRAN) injection 4 mg, Q6H PRN  polyethylene glycol (GLYCOLAX) packet 17 g, Daily PRN           PHYSICAL EXAM   BP (!) 143/91   Pulse 82   Temp 98.7 °F (37.1 °C) (Oral)   Resp 18   Ht 1.854 m (6' 1\")   Wt 114.8 kg (253 lb)   SpO2 97%   BMI 33.38 kg/m²    Vitals:    07/07/24 2353 07/07/24 2357 07/08/24 0410 07/08/24 0809   BP:  (!) 152/106 (!) 143/91 (!) 143/91   Pulse: 70 75 82    Resp:   18    Temp:  98.6 °F (37 °C) 98.7 °F (37.1 °C)    TempSrc:  Oral Oral    SpO2: 98%  97%    Weight:    114.8 kg (253 lb)   Height:    1.854 m (6' 1\")         Intake/Output Summary (Last 24 hours) at 7/8/2024 1457  Last data filed at 7/7/2024 1423  Gross 
  Hospital Medicine Progress Note      Date of Admission: 7/6/2024  Hospital Day: 2    Chief Admission Complaint:  chest pain     Subjective: Patient is in hospital chair alert and oriented.  No new issues or complaints today.  Informed patient about cardiology's plan today and is in agreement.  Family at bedside.  Questions answered.    Presenting Admission History:       Arthur Klein is a 48 y.o. male with no significant past medical history who presents with STEMI. Activated in the field. Patient reports he had chest pressure associated with bilateral arm tingling and discomfort. Patient was working at the time. No previous history of same. No recent illness including fever, chills, cough, shorntess of breath. Found to be diaphoretic thus EMS called. EMS noted inferios ST elevations with lateral depressions and field activated STEMI was called. He was taken urgently to cath lab.     Patient seen in ICU post heart cath. Reports now pain free. Denies N/V or shortness of breath.    Assessment/Plan:      Current Principal Problem:  STEMI (ST elevation myocardial infarction) (HCC)    STEMI  -Etiology likely due to CAD   -Cardiology consulted, appreciate recommendation in advance   -Continue PO medications; metoprolol / crestor / valsartan / ASA   -ECHO pending   -Per cardiology; observe overnight & DC 7/8/24    Physical Exam Performed:      General appearance:  No apparent distress  Respiratory:  Normal respiratory effort.   Cardiovascular:  Regular rate and rhythm.  Abdomen:  Soft, non-tender, non-distended.  Musculoskeletal:  No edema  Neurologic:  Non-focal  Psychiatric:  Alert and oriented    /85   Pulse 76   Temp 98.2 °F (36.8 °C) (Oral)   Resp 23   Ht 1.854 m (6' 1\")   Wt 115 kg (253 lb 8.5 oz)   SpO2 98%   BMI 33.45 kg/m²     Diet: ADULT DIET; Regular; Low Fat/Low Chol/High Fiber/2 gm Na  DVT Prophylaxis: [x]PPx LMWH  []SQ Heparin  []IPC/SCDs  []Eliquis  []Xarelto  []Coumadin  []Other -      Code 
Patient discharged home at this time. Discharge paperwork reviewed with patient including medications and follow up appointment. Patient noted as stable at time of discharge and all belongings sent home with patient.   
disease in a couple of septal branches, his lesion is in the mid rather than proximal LAD where it as well as severe, as such medical management may be best in the short-term given these complexities.     ICM, bblocker, arb    Hchol, statin    Ok to tx out of icu, obs o/n and dc tomorrow.      Thank you for allowing me to participate in the care of your patient. Please call me with any questions [phone].      Douglas Gore MD, [facility]   Interventional Cardiologist  [facility]  [phone] [place] Office  [phone] [place] Office  7/7/2024 9:05 AM

## 2024-07-12 ENCOUNTER — TELEPHONE (OUTPATIENT)
Dept: CARDIOLOGY CLINIC | Age: 49
End: 2024-07-12

## 2024-07-18 ENCOUNTER — OFFICE VISIT (OUTPATIENT)
Dept: CARDIOLOGY CLINIC | Age: 49
End: 2024-07-18
Payer: COMMERCIAL

## 2024-07-18 VITALS
HEIGHT: 73 IN | OXYGEN SATURATION: 92 % | WEIGHT: 245 LBS | DIASTOLIC BLOOD PRESSURE: 72 MMHG | SYSTOLIC BLOOD PRESSURE: 110 MMHG | BODY MASS INDEX: 32.47 KG/M2 | HEART RATE: 74 BPM

## 2024-07-18 DIAGNOSIS — I21.19 ST-SEGMENT ELEVATION MYOCARDIAL INFARCTION (STEMI) OF INFERIOR WALL (HCC): Primary | ICD-10-CM

## 2024-07-18 DIAGNOSIS — E78.2 MIXED HYPERLIPIDEMIA: ICD-10-CM

## 2024-07-18 DIAGNOSIS — I25.10 CAD IN NATIVE ARTERY: ICD-10-CM

## 2024-07-18 DIAGNOSIS — I25.5 ISCHEMIC CARDIOMYOPATHY: ICD-10-CM

## 2024-07-18 PROBLEM — I21.3 STEMI (ST ELEVATION MYOCARDIAL INFARCTION) (HCC): Status: RESOLVED | Noted: 2024-07-06 | Resolved: 2024-07-18

## 2024-07-18 PROCEDURE — 1036F TOBACCO NON-USER: CPT | Performed by: NURSE PRACTITIONER

## 2024-07-18 PROCEDURE — 1111F DSCHRG MED/CURRENT MED MERGE: CPT | Performed by: NURSE PRACTITIONER

## 2024-07-18 PROCEDURE — G8417 CALC BMI ABV UP PARAM F/U: HCPCS | Performed by: NURSE PRACTITIONER

## 2024-07-18 PROCEDURE — G8427 DOCREV CUR MEDS BY ELIG CLIN: HCPCS | Performed by: NURSE PRACTITIONER

## 2024-07-18 PROCEDURE — 99215 OFFICE O/P EST HI 40 MIN: CPT | Performed by: NURSE PRACTITIONER

## 2024-07-18 RX ORDER — ROSUVASTATIN CALCIUM 20 MG/1
20 TABLET, COATED ORAL NIGHTLY
Qty: 90 TABLET | Refills: 3 | Status: SHIPPED | OUTPATIENT
Start: 2024-07-18

## 2024-07-18 RX ORDER — METOPROLOL SUCCINATE 25 MG/1
25 TABLET, EXTENDED RELEASE ORAL DAILY
Qty: 90 TABLET | Refills: 3 | Status: SHIPPED | OUTPATIENT
Start: 2024-07-18

## 2024-07-18 RX ORDER — VALSARTAN 80 MG/1
80 TABLET ORAL DAILY
Qty: 90 TABLET | Refills: 3 | Status: SHIPPED | OUTPATIENT
Start: 2024-07-18

## 2024-07-18 NOTE — PATIENT INSTRUCTIONS
Check labs: CMP and lipids in October 2024 (Fasting labs)    Continue same medications      [phone] [place] ([phone][facility] to schedule  -will call you with type of stress test and then call and schedule

## 2024-07-23 ENCOUNTER — TELEPHONE (OUTPATIENT)
Dept: CARDIOLOGY CLINIC | Age: 49
End: 2024-07-23

## 2024-07-23 DIAGNOSIS — I25.83 CORONARY ARTERY DISEASE DUE TO LIPID RICH PLAQUE: Primary | ICD-10-CM

## 2024-07-23 DIAGNOSIS — I25.10 CORONARY ARTERY DISEASE DUE TO LIPID RICH PLAQUE: Primary | ICD-10-CM

## 2024-07-23 NOTE — TELEPHONE ENCOUNTER
----- Message from Douglas Gore MD sent at 7/18/2024  8:00 PM EDT -----  I would rec: exercise nuc stress test.    Thanks,    [place]  ----- Message -----  From: Enzweiler, Diane M, APRN - CNP  Sent: 7/18/2024   5:49 PM EDT  To: MD Dr. Bao Duggan: please review and advise on stress testing. Thanks!

## 2024-07-23 NOTE — TELEPHONE ENCOUNTER
Attempted to call Arthur and discuss Dr. Gore's rec regarding stress test and unable to reach him.    Will have RN follow up.    Order for exercise nuclear stress test has been placed.

## 2024-08-08 NOTE — TELEPHONE ENCOUNTER
Attempted to call pt with no answer. Unable to leave voicemail. Stress scheduled per Epic for 8/21/24 @ 0894.

## 2024-08-21 ENCOUNTER — HOSPITAL ENCOUNTER (OUTPATIENT)
Age: 49
Discharge: HOME | End: 2024-08-23
Payer: COMMERCIAL

## 2024-08-21 ENCOUNTER — HOSPITAL ENCOUNTER (OUTPATIENT)
Dept: NUCLEAR MEDICINE | Age: 49
Discharge: HOME | End: 2024-08-21
Payer: COMMERCIAL

## 2024-08-21 DIAGNOSIS — I25.10 CORONARY ARTERY DISEASE DUE TO LIPID RICH PLAQUE: ICD-10-CM

## 2024-08-21 DIAGNOSIS — I25.83 CORONARY ARTERY DISEASE DUE TO LIPID RICH PLAQUE: ICD-10-CM

## 2024-08-21 LAB
NUC STRESS EJECTION FRACTION: 67 %
NUC STRESS LV EDV: 122 ML (ref 67–155)
NUC STRESS LV ESV: 40 ML (ref 22–58)
NUC STRESS LV MASS: 148 G
STRESS ANGINA INDEX: 0
STRESS BASELINE DIAS BP: 94 MMHG
STRESS BASELINE HR: 58 BPM
STRESS BASELINE SYS BP: 134 MMHG
STRESS ESTIMATED WORKLOAD: 10 METS
STRESS EXERCISE DUR MIN: 8 MIN
STRESS PEAK DIAS BP: 86 MMHG
STRESS PEAK SYS BP: 220 MMHG
STRESS PERCENT HR ACHIEVED: 88 %
STRESS POST PEAK HR: 151 BPM
STRESS RATE PRESSURE PRODUCT: NORMAL BPM*MMHG
STRESS TARGET HR: 171 BPM
TID: 0.94

## 2024-08-21 PROCEDURE — A9502 TC99M TETROFOSMIN: HCPCS | Performed by: NURSE PRACTITIONER

## 2024-08-21 PROCEDURE — 3430000000 HC RX DIAGNOSTIC RADIOPHARMACEUTICAL: Performed by: NURSE PRACTITIONER

## 2024-08-21 PROCEDURE — 93017 CV STRESS TEST TRACING ONLY: CPT

## 2024-08-21 PROCEDURE — 78452 HT MUSCLE IMAGE SPECT MULT: CPT

## 2024-08-21 RX ADMIN — TETROFOSMIN 10.5 MILLICURIE: 1.38 INJECTION, POWDER, LYOPHILIZED, FOR SOLUTION INTRAVENOUS at 08:14

## 2024-08-21 RX ADMIN — TETROFOSMIN 32 MILLICURIE: 1.38 INJECTION, POWDER, LYOPHILIZED, FOR SOLUTION INTRAVENOUS at 09:23

## 2024-08-28 NOTE — PROGRESS NOTES
Diane Enzweiler, CNP in 2 months         Scribe's attestation:  This note was scribed in the presence of Dr.Steven Gore by Mayra Nunez RN       I, Dr Douglas Gore, personally performed the services described in this documentation, as scribed by the above signed scribe in my presence.  It is both accurate and complete to my knowledge.  I agree with the details independently gathered by the clinical support staff and the scribed note accurately describes my personal service to the patient.      Thank you for allowing us to participate in the care of Arthur Klein. Please call me with any questions [phone].    Douglas Gore MD, [facility]   Interventional Cardiologist  [facility]  [phone] [place] Office  [phone] [place] Office  9/3/2024 1:19 PM

## 2024-09-03 ENCOUNTER — OFFICE VISIT (OUTPATIENT)
Dept: CARDIOLOGY CLINIC | Age: 49
End: 2024-09-03
Payer: COMMERCIAL

## 2024-09-03 VITALS
HEIGHT: 73 IN | DIASTOLIC BLOOD PRESSURE: 72 MMHG | BODY MASS INDEX: 32.4 KG/M2 | HEART RATE: 75 BPM | OXYGEN SATURATION: 96 % | WEIGHT: 244.5 LBS | SYSTOLIC BLOOD PRESSURE: 146 MMHG

## 2024-09-03 DIAGNOSIS — I25.10 CORONARY ARTERY DISEASE INVOLVING NATIVE CORONARY ARTERY OF NATIVE HEART WITHOUT ANGINA PECTORIS: ICD-10-CM

## 2024-09-03 DIAGNOSIS — I21.19 ST-SEGMENT ELEVATION MYOCARDIAL INFARCTION (STEMI) OF INFERIOR WALL (HCC): ICD-10-CM

## 2024-09-03 DIAGNOSIS — R94.39 ABNORMAL STRESS TEST: ICD-10-CM

## 2024-09-03 DIAGNOSIS — I25.5 ISCHEMIC CARDIOMYOPATHY: Primary | ICD-10-CM

## 2024-09-03 DIAGNOSIS — E78.2 MIXED HYPERLIPIDEMIA: ICD-10-CM

## 2024-09-03 PROCEDURE — G8417 CALC BMI ABV UP PARAM F/U: HCPCS | Performed by: INTERNAL MEDICINE

## 2024-09-03 PROCEDURE — 1036F TOBACCO NON-USER: CPT | Performed by: INTERNAL MEDICINE

## 2024-09-03 PROCEDURE — G8427 DOCREV CUR MEDS BY ELIG CLIN: HCPCS | Performed by: INTERNAL MEDICINE

## 2024-09-03 PROCEDURE — 99215 OFFICE O/P EST HI 40 MIN: CPT | Performed by: INTERNAL MEDICINE

## 2024-09-03 NOTE — PATIENT INSTRUCTIONS
Plan:  Blood pressure log reviewed. Continue to monitor blood pressure at home. Recommend monitoring blood pressure at home 4-5 times a week. Keep a log of blood pressure and heart rate. Goal 130/80 or less. Call office for further intervention if consistently elevated above goal.    Reviewed cardiac stress test results abnormal ~ recommend proceeding with left heart catheterization (angiogram) with percutaneous coronary intervention, discussed alternative proceeding with medical therapy management.   I recommend that the patient continue their currently prescribed medications. Their drug modifiable risk factors appear to be well controlled. I will continue to address the need/dosing of medications in future visits.   Recommend proceeding with cardiac rehabilitation ~ history of STEMI and percutaneous coronary intervention   Order lipid panel (fasting)  Follow up with Diane Enzweiler, CNP in 2 months

## 2024-11-05 NOTE — PROGRESS NOTES
[facility]  Office Visit    Arthur Klein  1975 November 14, 2024    CC:   Chief Complaint   Patient presents with    Follow-up    Coronary Artery Disease    Cardiomyopathy    Hyperlipidemia     HPI:  The patient is 48 y.o. male with PMH significant for STEMI, CAD, HLP and ischemic cardiomyopathy who is here for follow up of his CAD, HTN and HLP.      He was hospitalized at [facility] from 7/6/24-7/8/24 after presenting with chest pain and acute STEMI. ECG showed inferior ST elevations with lateral depressions. He was taken urgently to cath lab and treated with PCI of RCA. He had residual left side disease and seen by Dr. Gore who opted to continue medical management as vessel was somewhat small and wanted to consider outpatient stress testing and consider staged PCI. Echo showed LVEF of  50-55%. Placed on GDMT and discharged with follow up. He underwent stress testing in August 2024 which demonstrated reversible ischemia.  He was last seen in September 2024 with Dr. Gore  and he was asked to maintain BP log, [facility] recommended d/t abnormal stress test and he wanted to continue medical management.  He was referred for cardiac rehab.     Overall doing okay.  He has been active (has been splitting wood without chest pain). He has been riding stationary bike most days 25-30 minutes without symptoms.  Recently injured foot and now ready to return to bike. Denies chest pain/discomfort, SOB, orthopnea/PND, cough, palpitations, dizziness, syncope, edema , weight change or claudication. He has been restricting his diet more closely.  Increasing his protein intake.    Review of Systems:  Constitutional: Denies  fatigue, weakness, night sweats or fever.   HEENT: Denies new visual changes, ringing in ears, nosebleeds,nasal congestion  Respiratory: Denies new or change in SOB, PND, orthopnea or cough.   Cardiovascular: see HPI  GI: Denies N/V, diarrhea, constipation, abdominal pain, change in bowel habits,

## 2024-11-08 ENCOUNTER — TELEPHONE (OUTPATIENT)
Dept: CARDIOLOGY CLINIC | Age: 49
End: 2024-11-08

## 2024-11-14 ENCOUNTER — OFFICE VISIT (OUTPATIENT)
Dept: CARDIOLOGY CLINIC | Age: 49
End: 2024-11-14
Payer: COMMERCIAL

## 2024-11-14 VITALS
SYSTOLIC BLOOD PRESSURE: 120 MMHG | OXYGEN SATURATION: 96 % | HEART RATE: 77 BPM | WEIGHT: 251 LBS | DIASTOLIC BLOOD PRESSURE: 78 MMHG | HEIGHT: 73 IN | BODY MASS INDEX: 33.27 KG/M2

## 2024-11-14 DIAGNOSIS — I25.10 CAD IN NATIVE ARTERY: Primary | ICD-10-CM

## 2024-11-14 DIAGNOSIS — I21.19 ST-SEGMENT ELEVATION MYOCARDIAL INFARCTION (STEMI) OF INFERIOR WALL (HCC): ICD-10-CM

## 2024-11-14 DIAGNOSIS — I25.5 ISCHEMIC CARDIOMYOPATHY: ICD-10-CM

## 2024-11-14 DIAGNOSIS — E78.2 MIXED HYPERLIPIDEMIA: ICD-10-CM

## 2024-11-14 PROCEDURE — 99214 OFFICE O/P EST MOD 30 MIN: CPT | Performed by: NURSE PRACTITIONER

## 2024-11-14 PROCEDURE — G8427 DOCREV CUR MEDS BY ELIG CLIN: HCPCS | Performed by: NURSE PRACTITIONER

## 2024-11-14 PROCEDURE — 1036F TOBACCO NON-USER: CPT | Performed by: NURSE PRACTITIONER

## 2024-11-14 PROCEDURE — G8417 CALC BMI ABV UP PARAM F/U: HCPCS | Performed by: NURSE PRACTITIONER

## 2024-11-14 PROCEDURE — G8484 FLU IMMUNIZE NO ADMIN: HCPCS | Performed by: NURSE PRACTITIONER

## 2024-11-14 RX ORDER — NITROGLYCERIN 0.4 MG/1
0.4 TABLET SUBLINGUAL EVERY 5 MIN PRN
Qty: 25 TABLET | Refills: 1 | Status: SHIPPED | OUTPATIENT
Start: 2024-11-14

## 2024-11-26 ENCOUNTER — TELEPHONE (OUTPATIENT)
Dept: CARDIOLOGY CLINIC | Age: 49
End: 2024-11-26

## 2024-11-26 NOTE — TELEPHONE ENCOUNTER
Pt stated that he would like to get stents placed. I believe pt wants to schedule the cath that srramez recommended at last ov on 9/7/24    Reviewed cardiac stress test results abnormal ~ recommend proceeding with left heart catheterization (angiogram) with percutaneous coronary intervention, discussed alternative proceeding with medical therapy management.  He understands the r/b/a/o pci of lad which may incrs risk of loss of branch vessels and this may result in MI.    ~   Patient defer instructed to call office with decision.

## 2024-12-13 NOTE — TELEPHONE ENCOUNTER
Procedure:  PCI  Doctor:  Dr. Gore  Date:  12/27/24  Time:  9am  Arrival:  7:30am  Reps:  Maynor  Anesthesia:  unavailable - pt is ok w/o.      Spoke with patient. Please have patient arrive to the main entrance of [facility] ([address] [place] [phone]) and check in with the registration desk.  They will be directed to the Cath Lab.  Please call patient regarding medication instructions. Remind patient to be NPO after midnight (8 hours prior). Do not apply lotions/creams on skin the day of procedure.

## 2024-12-13 NOTE — TELEPHONE ENCOUNTER
Lets do high risk protocol w/ [place] for rep.  Lets ask pt if he wants anaesthesia.  I am ok with mod sedation if he is comfortable with that.  Thank you.

## 2024-12-17 NOTE — TELEPHONE ENCOUNTER
Medication instructions for procedure;    No vitamins or minerals the morning of procedure  No OTC medications    Called and spoke with patient. Pt MACHELLE

## 2024-12-26 ENCOUNTER — ANESTHESIA EVENT (OUTPATIENT)
Dept: CARDIAC CATH/INVASIVE PROCEDURES | Age: 49
End: 2024-12-26
Payer: COMMERCIAL

## 2024-12-27 ENCOUNTER — HOSPITAL ENCOUNTER (OUTPATIENT)
Age: 49
Setting detail: OBSERVATION
Discharge: HOME | End: 2024-12-28
Attending: INTERNAL MEDICINE | Admitting: INTERNAL MEDICINE
Payer: COMMERCIAL

## 2024-12-27 ENCOUNTER — ANESTHESIA (OUTPATIENT)
Dept: CARDIAC CATH/INVASIVE PROCEDURES | Age: 49
End: 2024-12-27
Payer: COMMERCIAL

## 2024-12-27 DIAGNOSIS — I25.10 ATHEROSCLEROSIS OF NATIVE CORONARY ARTERY OF NATIVE HEART, UNSPECIFIED WHETHER ANGINA PRESENT: ICD-10-CM

## 2024-12-27 PROBLEM — Z98.890 STATUS POST CARDIAC CATHETERIZATION: Status: ACTIVE | Noted: 2024-12-27

## 2024-12-27 LAB
ANION GAP SERPL CALCULATED.3IONS-SCNC: 11 MMOL/L (ref 3–16)
BUN SERPL-MCNC: 19 MG/DL (ref 7–20)
CALCIUM SERPL-MCNC: 9.3 MG/DL (ref 8.3–10.6)
CHLORIDE SERPL-SCNC: 105 MMOL/L (ref 99–110)
CHOLEST SERPL-MCNC: 143 MG/DL (ref 0–199)
CO2 SERPL-SCNC: 23 MMOL/L (ref 21–32)
CREAT SERPL-MCNC: 0.7 MG/DL (ref 0.9–1.3)
DEPRECATED RDW RBC AUTO: 13.3 % (ref 12.4–15.4)
EKG ATRIAL RATE: 79 BPM
EKG ATRIAL RATE: 92 BPM
EKG DIAGNOSIS: NORMAL
EKG DIAGNOSIS: NORMAL
EKG P AXIS: 24 DEGREES
EKG P AXIS: 4 DEGREES
EKG P-R INTERVAL: 142 MS
EKG P-R INTERVAL: 146 MS
EKG Q-T INTERVAL: 366 MS
EKG Q-T INTERVAL: 412 MS
EKG QRS DURATION: 102 MS
EKG QRS DURATION: 98 MS
EKG QTC CALCULATION (BAZETT): 452 MS
EKG QTC CALCULATION (BAZETT): 472 MS
EKG R AXIS: 16 DEGREES
EKG R AXIS: 4 DEGREES
EKG T AXIS: -6 DEGREES
EKG T AXIS: 2 DEGREES
EKG VENTRICULAR RATE: 79 BPM
EKG VENTRICULAR RATE: 92 BPM
GFR SERPLBLD CREATININE-BSD FMLA CKD-EPI: >90 ML/MIN/{1.73_M2}
GLUCOSE SERPL-MCNC: 105 MG/DL (ref 70–99)
HCT VFR BLD AUTO: 45.5 % (ref 40.5–52.5)
HDLC SERPL-MCNC: 40 MG/DL (ref 40–60)
HGB BLD-MCNC: 15.9 G/DL (ref 13.5–17.5)
LDLC SERPL CALC-MCNC: 81 MG/DL
MCH RBC QN AUTO: 29.8 PG (ref 26–34)
MCHC RBC AUTO-ENTMCNC: 34.9 G/DL (ref 31–36)
MCV RBC AUTO: 85.4 FL (ref 80–100)
PLATELET # BLD AUTO: 152 K/UL (ref 135–450)
PMV BLD AUTO: 9.7 FL (ref 5–10.5)
POC ACT LR: 230 SEC
POC ACT LR: 276 SEC
POC ACT LR: 297 SEC
POC ACT LR: 308 SEC
POC ACT LR: 325 SEC
POC ACT LR: 329 SEC
POC ACT LR: 371 SEC
POC ACT LR: 390 SEC
POTASSIUM SERPL-SCNC: 4.2 MMOL/L (ref 3.5–5.1)
RBC # BLD AUTO: 5.33 M/UL (ref 4.2–5.9)
SODIUM SERPL-SCNC: 139 MMOL/L (ref 136–145)
TRIGL SERPL-MCNC: 108 MG/DL (ref 0–150)
VLDLC SERPL CALC-MCNC: 22 MG/DL
WBC # BLD AUTO: 7 K/UL (ref 4–11)

## 2024-12-27 PROCEDURE — 6360000002 HC RX W HCPCS: Performed by: NURSE ANESTHETIST, CERTIFIED REGISTERED

## 2024-12-27 PROCEDURE — 2580000003 HC RX 258: Performed by: STUDENT IN AN ORGANIZED HEALTH CARE EDUCATION/TRAINING PROGRAM

## 2024-12-27 PROCEDURE — 7100000000 HC PACU RECOVERY - FIRST 15 MIN: Performed by: INTERNAL MEDICINE

## 2024-12-27 PROCEDURE — 2500000003 HC RX 250 WO HCPCS: Performed by: NURSE ANESTHETIST, CERTIFIED REGISTERED

## 2024-12-27 PROCEDURE — 6360000004 HC RX CONTRAST MEDICATION: Performed by: INTERNAL MEDICINE

## 2024-12-27 PROCEDURE — 7100000011 HC PHASE II RECOVERY - ADDTL 15 MIN: Performed by: INTERNAL MEDICINE

## 2024-12-27 PROCEDURE — 80048 BASIC METABOLIC PNL TOTAL CA: CPT

## 2024-12-27 PROCEDURE — 6360000002 HC RX W HCPCS: Performed by: INTERNAL MEDICINE

## 2024-12-27 PROCEDURE — 2709999900 HC NON-CHARGEABLE SUPPLY: Performed by: INTERNAL MEDICINE

## 2024-12-27 PROCEDURE — 92978 ENDOLUMINL IVUS OCT C 1ST: CPT | Performed by: INTERNAL MEDICINE

## 2024-12-27 PROCEDURE — C1753 CATH, INTRAVAS ULTRASOUND: HCPCS | Performed by: INTERNAL MEDICINE

## 2024-12-27 PROCEDURE — 93010 ELECTROCARDIOGRAM REPORT: CPT | Performed by: INTERNAL MEDICINE

## 2024-12-27 PROCEDURE — C1894 INTRO/SHEATH, NON-LASER: HCPCS | Performed by: INTERNAL MEDICINE

## 2024-12-27 PROCEDURE — 92979 ENDOLUMINL IVUS OCT C EA: CPT | Performed by: INTERNAL MEDICINE

## 2024-12-27 PROCEDURE — 7100000001 HC PACU RECOVERY - ADDTL 15 MIN: Performed by: INTERNAL MEDICINE

## 2024-12-27 PROCEDURE — 92928 PRQ TCAT PLMT NTRAC ST 1 LES: CPT | Performed by: INTERNAL MEDICINE

## 2024-12-27 PROCEDURE — C1874 STENT, COATED/COV W/DEL SYS: HCPCS | Performed by: INTERNAL MEDICINE

## 2024-12-27 PROCEDURE — 3700000001 HC ADD 15 MINUTES (ANESTHESIA): Performed by: INTERNAL MEDICINE

## 2024-12-27 PROCEDURE — 2580000003 HC RX 258: Performed by: NURSE ANESTHETIST, CERTIFIED REGISTERED

## 2024-12-27 PROCEDURE — 2580000003 HC RX 258: Performed by: INTERNAL MEDICINE

## 2024-12-27 PROCEDURE — 6370000000 HC RX 637 (ALT 250 FOR IP): Performed by: INTERNAL MEDICINE

## 2024-12-27 PROCEDURE — 2500000003 HC RX 250 WO HCPCS: Performed by: ANESTHESIOLOGY

## 2024-12-27 PROCEDURE — G0378 HOSPITAL OBSERVATION PER HR: HCPCS

## 2024-12-27 PROCEDURE — 85027 COMPLETE CBC AUTOMATED: CPT

## 2024-12-27 PROCEDURE — 7100000010 HC PHASE II RECOVERY - FIRST 15 MIN: Performed by: INTERNAL MEDICINE

## 2024-12-27 PROCEDURE — 3700000000 HC ANESTHESIA ATTENDED CARE: Performed by: INTERNAL MEDICINE

## 2024-12-27 PROCEDURE — 2500000003 HC RX 250 WO HCPCS: Performed by: INTERNAL MEDICINE

## 2024-12-27 PROCEDURE — 93458 L HRT ARTERY/VENTRICLE ANGIO: CPT | Performed by: INTERNAL MEDICINE

## 2024-12-27 PROCEDURE — C1725 CATH, TRANSLUMIN NON-LASER: HCPCS | Performed by: INTERNAL MEDICINE

## 2024-12-27 PROCEDURE — 94761 N-INVAS EAR/PLS OXIMETRY MLT: CPT

## 2024-12-27 PROCEDURE — 80061 LIPID PANEL: CPT

## 2024-12-27 PROCEDURE — 2700000000 HC OXYGEN THERAPY PER DAY

## 2024-12-27 PROCEDURE — 99152 MOD SED SAME PHYS/QHP 5/>YRS: CPT | Performed by: INTERNAL MEDICINE

## 2024-12-27 PROCEDURE — C1769 GUIDE WIRE: HCPCS | Performed by: INTERNAL MEDICINE

## 2024-12-27 PROCEDURE — 2720000010 HC SURG SUPPLY STERILE: Performed by: INTERNAL MEDICINE

## 2024-12-27 PROCEDURE — C1887 CATHETER, GUIDING: HCPCS | Performed by: INTERNAL MEDICINE

## 2024-12-27 PROCEDURE — 92921 PR PRQ TRLUML CORONARY ANGIOPLASTY ADDL BRANCH: CPT | Performed by: INTERNAL MEDICINE

## 2024-12-27 PROCEDURE — 93005 ELECTROCARDIOGRAM TRACING: CPT | Performed by: INTERNAL MEDICINE

## 2024-12-27 PROCEDURE — 85347 COAGULATION TIME ACTIVATED: CPT

## 2024-12-27 PROCEDURE — 92921 HC PRQ CARDIAC ANGIO ADDL ART: CPT | Performed by: INTERNAL MEDICINE

## 2024-12-27 DEVICE — EVEROLIMUS-ELUTING PLATINUM CHROMIUM CORONARY STENT SYSTEM
Type: IMPLANTABLE DEVICE | Status: FUNCTIONAL
Brand: SYNERGY™ XD

## 2024-12-27 RX ORDER — SODIUM CHLORIDE 0.9 % (FLUSH) 0.9 %
5-40 SYRINGE (ML) INJECTION EVERY 12 HOURS SCHEDULED
Status: DISCONTINUED | OUTPATIENT
Start: 2024-12-27 | End: 2024-12-27 | Stop reason: HOSPADM

## 2024-12-27 RX ORDER — ACETAMINOPHEN 650 MG/1
650 SUPPOSITORY RECTAL EVERY 6 HOURS PRN
Status: DISCONTINUED | OUTPATIENT
Start: 2024-12-27 | End: 2024-12-28 | Stop reason: HOSPADM

## 2024-12-27 RX ORDER — MIDAZOLAM HYDROCHLORIDE 1 MG/ML
INJECTION, SOLUTION INTRAMUSCULAR; INTRAVENOUS
Status: DISCONTINUED | OUTPATIENT
Start: 2024-12-27 | End: 2024-12-27 | Stop reason: SDUPTHER

## 2024-12-27 RX ORDER — SODIUM CHLORIDE 0.9 % (FLUSH) 0.9 %
5-40 SYRINGE (ML) INJECTION PRN
Status: DISCONTINUED | OUTPATIENT
Start: 2024-12-27 | End: 2024-12-28 | Stop reason: HOSPADM

## 2024-12-27 RX ORDER — NICARDIPINE HYDROCHLORIDE 2.5 MG/ML
INJECTION INTRAVENOUS PRN
Status: DISCONTINUED | OUTPATIENT
Start: 2024-12-27 | End: 2024-12-27 | Stop reason: HOSPADM

## 2024-12-27 RX ORDER — LORAZEPAM 0.5 MG/1
0.5 TABLET ORAL
Status: DISCONTINUED | OUTPATIENT
Start: 2024-12-27 | End: 2024-12-27 | Stop reason: HOSPADM

## 2024-12-27 RX ORDER — ONDANSETRON 2 MG/ML
4 INJECTION INTRAMUSCULAR; INTRAVENOUS EVERY 6 HOURS PRN
Status: DISCONTINUED | OUTPATIENT
Start: 2024-12-27 | End: 2024-12-27 | Stop reason: HOSPADM

## 2024-12-27 RX ORDER — METOPROLOL SUCCINATE 25 MG/1
25 TABLET, EXTENDED RELEASE ORAL DAILY
Status: DISCONTINUED | OUTPATIENT
Start: 2024-12-27 | End: 2024-12-28 | Stop reason: HOSPADM

## 2024-12-27 RX ORDER — SODIUM CHLORIDE 0.9 % (FLUSH) 0.9 %
5-40 SYRINGE (ML) INJECTION PRN
Status: DISCONTINUED | OUTPATIENT
Start: 2024-12-27 | End: 2024-12-27 | Stop reason: HOSPADM

## 2024-12-27 RX ORDER — ACETAMINOPHEN 325 MG/1
650 TABLET ORAL EVERY 6 HOURS PRN
Status: DISCONTINUED | OUTPATIENT
Start: 2024-12-27 | End: 2024-12-28 | Stop reason: HOSPADM

## 2024-12-27 RX ORDER — OXYCODONE HYDROCHLORIDE 5 MG/1
5 TABLET ORAL PRN
Status: DISCONTINUED | OUTPATIENT
Start: 2024-12-27 | End: 2024-12-27 | Stop reason: HOSPADM

## 2024-12-27 RX ORDER — ROCURONIUM BROMIDE 10 MG/ML
INJECTION, SOLUTION INTRAVENOUS
Status: DISCONTINUED | OUTPATIENT
Start: 2024-12-27 | End: 2024-12-27 | Stop reason: SDUPTHER

## 2024-12-27 RX ORDER — SUCCINYLCHOLINE/SOD CL,ISO/PF 100 MG/5ML
SYRINGE (ML) INTRAVENOUS
Status: DISCONTINUED | OUTPATIENT
Start: 2024-12-27 | End: 2024-12-27 | Stop reason: SDUPTHER

## 2024-12-27 RX ORDER — NITROGLYCERIN 20 MG/100ML
INJECTION INTRAVENOUS CONTINUOUS PRN
Status: COMPLETED | OUTPATIENT
Start: 2024-12-27 | End: 2024-12-27

## 2024-12-27 RX ORDER — PROPOFOL 10 MG/ML
INJECTION, EMULSION INTRAVENOUS
Status: DISCONTINUED | OUTPATIENT
Start: 2024-12-27 | End: 2024-12-27 | Stop reason: SDUPTHER

## 2024-12-27 RX ORDER — ROSUVASTATIN CALCIUM 10 MG/1
20 TABLET, COATED ORAL NIGHTLY
Status: DISCONTINUED | OUTPATIENT
Start: 2024-12-27 | End: 2024-12-28 | Stop reason: HOSPADM

## 2024-12-27 RX ORDER — ONDANSETRON 2 MG/ML
INJECTION INTRAMUSCULAR; INTRAVENOUS
Status: DISCONTINUED | OUTPATIENT
Start: 2024-12-27 | End: 2024-12-27 | Stop reason: SDUPTHER

## 2024-12-27 RX ORDER — LIDOCAINE HYDROCHLORIDE 10 MG/ML
0.3 INJECTION, SOLUTION EPIDURAL; INFILTRATION; INTRACAUDAL; PERINEURAL
Status: DISCONTINUED | OUTPATIENT
Start: 2024-12-27 | End: 2024-12-27 | Stop reason: HOSPADM

## 2024-12-27 RX ORDER — ENOXAPARIN SODIUM 100 MG/ML
30 INJECTION SUBCUTANEOUS 2 TIMES DAILY
Status: DISCONTINUED | OUTPATIENT
Start: 2024-12-28 | End: 2024-12-28 | Stop reason: HOSPADM

## 2024-12-27 RX ORDER — HEPARIN SODIUM 1000 [USP'U]/ML
INJECTION, SOLUTION INTRAVENOUS; SUBCUTANEOUS PRN
Status: DISCONTINUED | OUTPATIENT
Start: 2024-12-27 | End: 2024-12-27 | Stop reason: HOSPADM

## 2024-12-27 RX ORDER — ACETAMINOPHEN 325 MG/1
650 TABLET ORAL EVERY 4 HOURS PRN
Status: DISCONTINUED | OUTPATIENT
Start: 2024-12-27 | End: 2024-12-28 | Stop reason: HOSPADM

## 2024-12-27 RX ORDER — ASPIRIN 325 MG
325 TABLET ORAL ONCE
Status: DISCONTINUED | OUTPATIENT
Start: 2024-12-27 | End: 2024-12-27 | Stop reason: HOSPADM

## 2024-12-27 RX ORDER — ASPIRIN 81 MG/1
81 TABLET, CHEWABLE ORAL DAILY
Status: DISCONTINUED | OUTPATIENT
Start: 2024-12-28 | End: 2024-12-28 | Stop reason: HOSPADM

## 2024-12-27 RX ORDER — NALOXONE HYDROCHLORIDE 0.4 MG/ML
INJECTION, SOLUTION INTRAMUSCULAR; INTRAVENOUS; SUBCUTANEOUS PRN
Status: DISCONTINUED | OUTPATIENT
Start: 2024-12-27 | End: 2024-12-27 | Stop reason: HOSPADM

## 2024-12-27 RX ORDER — CALCIUM GLUCONATE 94 MG/ML
INJECTION, SOLUTION INTRAVENOUS
Status: DISCONTINUED | OUTPATIENT
Start: 2024-12-27 | End: 2024-12-27 | Stop reason: SDUPTHER

## 2024-12-27 RX ORDER — SODIUM CHLORIDE 9 MG/ML
INJECTION, SOLUTION INTRAVENOUS PRN
Status: DISCONTINUED | OUTPATIENT
Start: 2024-12-27 | End: 2024-12-28 | Stop reason: HOSPADM

## 2024-12-27 RX ORDER — POLYETHYLENE GLYCOL 3350 17 G/17G
17 POWDER, FOR SOLUTION ORAL DAILY PRN
Status: DISCONTINUED | OUTPATIENT
Start: 2024-12-27 | End: 2024-12-28 | Stop reason: HOSPADM

## 2024-12-27 RX ORDER — SODIUM CHLORIDE 9 MG/ML
INJECTION, SOLUTION INTRAVENOUS PRN
Status: DISCONTINUED | OUTPATIENT
Start: 2024-12-27 | End: 2024-12-27 | Stop reason: HOSPADM

## 2024-12-27 RX ORDER — IOPAMIDOL 755 MG/ML
INJECTION, SOLUTION INTRAVASCULAR PRN
Status: DISCONTINUED | OUTPATIENT
Start: 2024-12-27 | End: 2024-12-27 | Stop reason: HOSPADM

## 2024-12-27 RX ORDER — ONDANSETRON 2 MG/ML
4 INJECTION INTRAMUSCULAR; INTRAVENOUS EVERY 30 MIN PRN
Status: DISCONTINUED | OUTPATIENT
Start: 2024-12-27 | End: 2024-12-27 | Stop reason: HOSPADM

## 2024-12-27 RX ORDER — LIDOCAINE HYDROCHLORIDE 20 MG/ML
INJECTION, SOLUTION INFILTRATION; PERINEURAL
Status: DISCONTINUED | OUTPATIENT
Start: 2024-12-27 | End: 2024-12-27 | Stop reason: SDUPTHER

## 2024-12-27 RX ORDER — SODIUM CHLORIDE 0.9 % (FLUSH) 0.9 %
5-40 SYRINGE (ML) INJECTION EVERY 12 HOURS SCHEDULED
Status: DISCONTINUED | OUTPATIENT
Start: 2024-12-27 | End: 2024-12-28 | Stop reason: HOSPADM

## 2024-12-27 RX ORDER — POTASSIUM CHLORIDE 7.45 MG/ML
10 INJECTION INTRAVENOUS PRN
Status: DISCONTINUED | OUTPATIENT
Start: 2024-12-27 | End: 2024-12-28 | Stop reason: HOSPADM

## 2024-12-27 RX ORDER — OXYCODONE HYDROCHLORIDE 5 MG/1
10 TABLET ORAL PRN
Status: DISCONTINUED | OUTPATIENT
Start: 2024-12-27 | End: 2024-12-27 | Stop reason: HOSPADM

## 2024-12-27 RX ORDER — PROCHLORPERAZINE EDISYLATE 5 MG/ML
10 INJECTION INTRAMUSCULAR; INTRAVENOUS EVERY 6 HOURS PRN
Status: DISCONTINUED | OUTPATIENT
Start: 2024-12-27 | End: 2024-12-28 | Stop reason: HOSPADM

## 2024-12-27 RX ORDER — GLYCOPYRROLATE 0.2 MG/ML
INJECTION INTRAMUSCULAR; INTRAVENOUS
Status: DISCONTINUED | OUTPATIENT
Start: 2024-12-27 | End: 2024-12-27 | Stop reason: SDUPTHER

## 2024-12-27 RX ORDER — VALSARTAN 80 MG/1
80 TABLET ORAL DAILY
Status: DISCONTINUED | OUTPATIENT
Start: 2024-12-27 | End: 2024-12-28 | Stop reason: HOSPADM

## 2024-12-27 RX ORDER — SODIUM CHLORIDE, SODIUM LACTATE, POTASSIUM CHLORIDE, CALCIUM CHLORIDE 600; 310; 30; 20 MG/100ML; MG/100ML; MG/100ML; MG/100ML
INJECTION, SOLUTION INTRAVENOUS CONTINUOUS
Status: DISCONTINUED | OUTPATIENT
Start: 2024-12-27 | End: 2024-12-27 | Stop reason: HOSPADM

## 2024-12-27 RX ORDER — MAGNESIUM SULFATE IN WATER 40 MG/ML
2000 INJECTION, SOLUTION INTRAVENOUS PRN
Status: DISCONTINUED | OUTPATIENT
Start: 2024-12-27 | End: 2024-12-28 | Stop reason: HOSPADM

## 2024-12-27 RX ORDER — FAMOTIDINE 10 MG/ML
20 INJECTION, SOLUTION INTRAVENOUS ONCE
Status: COMPLETED | OUTPATIENT
Start: 2024-12-27 | End: 2024-12-27

## 2024-12-27 RX ORDER — LABETALOL HYDROCHLORIDE 5 MG/ML
10 INJECTION, SOLUTION INTRAVENOUS
Status: DISCONTINUED | OUTPATIENT
Start: 2024-12-27 | End: 2024-12-27 | Stop reason: HOSPADM

## 2024-12-27 RX ORDER — POTASSIUM CHLORIDE 1500 MG/1
40 TABLET, EXTENDED RELEASE ORAL PRN
Status: DISCONTINUED | OUTPATIENT
Start: 2024-12-27 | End: 2024-12-28 | Stop reason: HOSPADM

## 2024-12-27 RX ORDER — DEXAMETHASONE SODIUM PHOSPHATE 4 MG/ML
INJECTION, SOLUTION INTRA-ARTICULAR; INTRALESIONAL; INTRAMUSCULAR; INTRAVENOUS; SOFT TISSUE
Status: DISCONTINUED | OUTPATIENT
Start: 2024-12-27 | End: 2024-12-27 | Stop reason: SDUPTHER

## 2024-12-27 RX ADMIN — LIDOCAINE HYDROCHLORIDE 100 MG: 20 INJECTION, SOLUTION INFILTRATION; PERINEURAL at 10:05

## 2024-12-27 RX ADMIN — Medication 160 MG: at 10:05

## 2024-12-27 RX ADMIN — PHENYLEPHRINE HYDROCHLORIDE 50 MCG/MIN: 10 INJECTION INTRAVENOUS at 10:27

## 2024-12-27 RX ADMIN — DEXAMETHASONE SODIUM PHOSPHATE 8 MG: 4 INJECTION, SOLUTION INTRAMUSCULAR; INTRAVENOUS at 10:10

## 2024-12-27 RX ADMIN — SUGAMMADEX 400 MG: 100 INJECTION, SOLUTION INTRAVENOUS at 13:19

## 2024-12-27 RX ADMIN — TICAGRELOR 90 MG: 90 TABLET ORAL at 20:57

## 2024-12-27 RX ADMIN — ROSUVASTATIN CALCIUM 20 MG: 10 TABLET, FILM COATED ORAL at 20:57

## 2024-12-27 RX ADMIN — CALCIUM GLUCONATE 1 G: 98 INJECTION, SOLUTION INTRAVENOUS at 10:44

## 2024-12-27 RX ADMIN — ONDANSETRON 4 MG: 2 INJECTION INTRAMUSCULAR; INTRAVENOUS at 10:10

## 2024-12-27 RX ADMIN — TICAGRELOR 90 MG: 90 TABLET ORAL at 09:49

## 2024-12-27 RX ADMIN — PROPOFOL 150 MG: 10 INJECTION, EMULSION INTRAVENOUS at 10:05

## 2024-12-27 RX ADMIN — SODIUM CHLORIDE: 9 INJECTION, SOLUTION INTRAVENOUS at 08:20

## 2024-12-27 RX ADMIN — ROCURONIUM BROMIDE 10 MG: 50 INJECTION, SOLUTION INTRAVENOUS at 10:05

## 2024-12-27 RX ADMIN — ROCURONIUM BROMIDE 10 MG: 50 INJECTION, SOLUTION INTRAVENOUS at 11:58

## 2024-12-27 RX ADMIN — GLYCOPYRROLATE 0.1 MG: 0.2 INJECTION, SOLUTION INTRAMUSCULAR; INTRAVENOUS at 11:35

## 2024-12-27 RX ADMIN — MIDAZOLAM 2 MG: 1 INJECTION INTRAMUSCULAR; INTRAVENOUS at 10:00

## 2024-12-27 RX ADMIN — ROCURONIUM BROMIDE 40 MG: 50 INJECTION, SOLUTION INTRAVENOUS at 10:10

## 2024-12-27 RX ADMIN — GLYCOPYRROLATE 0.2 MG: 0.2 INJECTION, SOLUTION INTRAMUSCULAR; INTRAVENOUS at 11:27

## 2024-12-27 RX ADMIN — Medication 10 ML: at 20:57

## 2024-12-27 RX ADMIN — FAMOTIDINE 20 MG: 10 INJECTION, SOLUTION INTRAVENOUS at 08:26

## 2024-12-27 RX ADMIN — ROCURONIUM BROMIDE 10 MG: 50 INJECTION, SOLUTION INTRAVENOUS at 11:30

## 2024-12-27 NOTE — PROGRESS NOTES
Pt brought to PACU. Report obtained from OR RN and anesthesia. Pt placed on monitor NSR and O2 100% at 3L

## 2024-12-27 NOTE — H&P
Hospital Medicine History and Physical      Chief Complaint:  positive stress test      History and Present Illness:  The patient is a pleasant 49 Y M with a BMI of 33 and a HTN, and HLD.  He never smoked.  Doesn't have a strong family h/o heart disease.  He developed chest pressure at work in 7/2024, EKG showed inferior STEMI, trop was 800, and he required emergent DESx3 to RCA.  He was also noted to have L-sided disease but this was not intervened upon originally.  EF was 50-55%.  Outpatient stress test showed ischemia, so he was scheduled for [facility] 12/27.  The procedure went well.  MEHRAN was deployed to the LAD and a septal branch was angioplastied.  Of note, cardiology commented that his diags and a septal branch were jailed by the LAD stent but there was still brisk flow.  He had no chest pain after the procedure.  No dyspnea.  R radial site looks good.  Cardiology placed him in observation overnight and asked hospital medicine to help manage his care.        General appearance: No apparent distress, appears stated age.  HEENT: Pupils equal, round.  Conjunctivae/corneas clear.  Neck: No jugular venous distention.   Respiratory:  Normal respiratory effort.  Bilaterally without Rales/Wheezes/Rhonchi.  Cardiovascular: Normal rate and regular rhythm with normal S1/S2 without murmurs, rubs or gallops.  Abdomen: Soft, non-tender, non-distended with normal bowel sounds.  Musculoskeletal: No cyanosis bilaterally.  No edema.  Without deformity.  Skin: No jaundice.  No rashes or lesions.  Neurologic:  Neurovascularly intact without any focal sensory/motor deficits. Cranial nerves: II-XII intact, grossly non-focal.  Psychiatric: Alert and oriented, normal insight.        Assessment and Plan:        CAD.  S/p MEHRAN to the LAD, also angioplasty of the first septal branch.  Continue aspirin, ticagrelor, statin, metoprolol.    Mild ischemic cardiomyopathy.  Valsartan, metoprolol.    HLD.  Statin.     Obesity.  Diet and exercise.  08:10 AM    TRIG 108 2024 08:10 AM     Hemoglobin A1C:   Lab Results   Component Value Date/Time    LABA1C 5.1 2024 11:42 PM     TSH: No results found for: \"TSH\"  Troponin: No results found for: \"TROPONINT\"  Lactic Acid: No results for input(s): \"LACTA\" in the last 72 hours.  BNP: No results for input(s): \"PROBNP\" in the last 72 hours.  UA:No results found for: \"NITRU\", \"COLORU\", \"PHUR\", \"LABCAST\", \"WBCUA\", \"RBCUA\", \"MUCUS\", \"TRICHOMONAS\", \"YEAST\", \"BACTERIA\", \"CLARITYU\", \"SPECGRAV\", \"LEUKOCYTESUR\", \"UROBILINOGEN\", \"BILIRUBINUR\", \"BLOODU\", \"GLUCOSEU\", \"KETUA\", \"AMORPHOUS\"  Urine Cultures: No results found for: \"LABURIN\"  Blood Cultures: No results found for: \"BC\"  No results found for: \"BLOODCULT2\"  Organism: No results found for: \"ORG\"    Imaging/Diagnostics Last 24 Hours   Cardiac procedure    Result Date: 2024  Table formatting from the original result was not included.       CARDIAC CATHETERIZATION REPORT Procedure Date:  2024 Patient Name: Arthur Klein MRN: [id number] : 1975 INDICATION Staged PCI s/p AMI Abnl stress test PROCEDURES PERFORMED Left heart catheterization LVgram Coronary angiogam Coronary cath IVUS of LM IVUS of LAD PCI of LAD with single drug eluting stent PTA of septal PROCEDURE DESCRIPTION Immediately before procedure, sedation assessment was performed again and prior findings as per sedation note (see that note for details) are unchanged. Risks/benefits/alternatives/outcomes were discussed with patient and/or family and informed consent was obtained.  Using the Barbeau scale, the patient's right radial artery was found to be a level B.  Patient was prepped draped in the usual sterile fashion.  Local anaesthetic was applied over puncture site.  Using u/s guidance with a back wall technique, a 6/7 Singaporean Terumo sheath was inserted into right radial artery.  Verapamil, nitroglycerin, cardene were administered through the sheath.  Heparin was administered.

## 2024-12-27 NOTE — PROGRESS NOTES
Brief Pre-Op Note/Sedation Assessment      Arthur Klein  1975  [id number]  9:54 AM    Planned Procedure: Cardiac Catheterization Procedure  Post Procedure Plan: Return to same level of care  Consent: I have discussed with the patient and/or the patient representative the indication, alternatives, and the possible risks and/or complications of the planned procedure and the anesthesia methods. The patient and/or patient representative appear to understand and agree to proceed.    DISCUSSION OF CARDIAC CATHETERIZATION PROCEDURES: The procedures, indications, risks and alternatives have been discussed with the patient and, as appropriate, with the patient's guardian . Risks discussed included, but are not limited to, bleeding, development of blood clots/emboli, damage to blood vessels, renal failure, malignant cardiac arrhythmias, stroke, heart attack, emergent coronary bypass surgery, death, dye allergy.  The patient (and guardian as appropriate) expressed understanding of the aforementioned and wished to proceed.    Pt/family understand this is a higher risk procedure, there are higher risks for complications/death.  Having understood r/b/a/o, including options for second opinions on this, pt/family wish to proceed with high risk PCI.  Pt/family understand this may involve gen anaesthesia and support devices and wish to proceed.          Chief Complaint:   Chest Pain/Pressure      Indications for Cath Procedure:  Presentation:  ACS > 24 hrs  2.  Anginal Classification within 2 weeks:  CCS III - Symptoms with everyday living activities, i.e., moderate limitation  3.  Angina Symptoms Assessment:  Typical Chest Pain  4.  Heart Failure Class within last 2 weeks:  No symptoms  5.  Cardiovascular Instability:  No    Prior Ischemic Workup/Eval:  Pre-Procedural Medications: Yes: Aspirin, Beta Blockers, and STATIN  2.   Stress Test Completed?  Yes:  Stress or Imaging Studies Performed (within ANY time period):   Type:

## 2024-12-27 NOTE — ANESTHESIA PRE PROCEDURE
Anesthesia Plan      general     ASA 3                               Emiliano Beebe MD   12/27/2024            
Patient/Spouse

## 2024-12-27 NOTE — DISCHARGE INSTRUCTIONS
Follow up with PCP within 1-2 weeks.  Follow up with cardiology per their instructions.           Cardiac Rehabilitation  A Comprehensive Approach to Recovery        What is Cardiac Rehabilitation:    The [facility] Cardiac Rehabilitation Program is designed to provide medically supervised, progressive exercise and education from our experienced staff. The program helps restore each individual to his or her highest level of physical, psychological, social and vocational wellness.     Goals of Cardiac Rehabilitation include:    Increased understanding of heart disease, its prevention and treatment  Increased strength and stamina  Assist in making the lifestyle changes necessary to lessen the risk of future heart problems  Provide support and motivation    Phase II Rehabilitation:    You will visit our team 3 days a week for up to 36 visits, depending on your insurance coverage. We will work with you to provide the following:    Exercise training to promote cardiovascular health and build strength and endurance  Education and emotional support regarding heart disease and overall general health and well-being  Smoking cessation information, if indicated  Relaxation and stress management techniques  Nutrition education and resources   Educate and give information about your medications  Discussion of exercise maintenance after rehabilitation is completed    A nurse trained in cardiac care supervises all sessions. Your physician will receive regularly scheduled reports from our staff. Should a problem occur during a session, the staff would notify your physician.     Your physician has referred you to Cardiac Rehab. Please call our department about 1 week after discharge from the hospital to arrange your first appointment. Make certain to schedule this appointment after your follow-up with your cardiologist. We look forward to working with you.     The Cardiac Rehab Staff at [facility] [phone].    Cath Labs  a responsible adult be with for the next 24 hours, for your protection and safety.  You are not allowed to drive yourself home, or take any type of public transportation.    Cardiac Rehab: If your doctor recommends Cardiac rehab, you will receive a call from that department to schedule your 1st appointment.    Your Care Instructions  There are many things that cause chest pain. Some are not serious and will get better on their own in a few days. But some kinds of chest pain need more testing and treatment. Your doctor may have recommended follow-up visit in the next 4 to 8 weeks. If you are not feeling better, you may need more tests or treatment. Even though your doctor has released you, you still need to watch for any problems. The doctor carefully checked you, but sometimes problems can develop later. If you have new symptoms or if your symptoms do not improve, get medical help right away. If you have worse or different chest pain or pressure that lasts more than 5 minutes or you passed out (lost consciouness), call 911 or seek other emergency help right away.  A medical visit is only one step in your treatment. Even if you feel better, you still need to do what your doctor recommends, such as going to all suggested follow-up appointments and taking medications exactly as directed. This will help you recover and help prevent future problems.  How can you care for yourself at home?  Rest until you feel better.  Take your medicine exactly as prescribed. Call your doctor if you think you are having problems with your medicine.  Do not drive after taking a prescription pain medication.  When should you call for help?  Call 911 if:  You passed out (lost consciousness).  You have severe difficulty breathing.  You have symptoms of a heart attack. These may include:  Chest pain or pressure, or a strange feeling in your chest.  Sweating.  Shortness of breath.  Nausea or vomiting.  Pain, pressure, or a strange feeling in

## 2024-12-27 NOTE — PROCEDURES
CARDIAC CATHETERIZATION REPORT     Procedure Date:  2024  Patient Name: Arthur Klein  MRN: [id number] : 1975      INDICATION     Staged PCI s/p AMI  Abnl stress test    PROCEDURES PERFORMED     Left heart catheterization  LVgram  Coronary angiogam  Coronary cath    IVUS of LM  IVUS of LAD    PCI of LAD with single drug eluting stent  PTA of septal           PROCEDURE DESCRIPTION   Immediately before procedure, sedation assessment was performed again and prior findings as per sedation note (see that note for details) are unchanged. Risks/benefits/alternatives/outcomes were discussed with patient and/or family and informed consent was obtained.  Using the Barbeau scale, the patient's right radial artery was found to be a level B.  Patient was prepped draped in the usual sterile fashion.  Local anaesthetic was applied over puncture site.  Using u/s guidance with a back wall technique, a 6/7 Arabic Terumo sheath was inserted into right radial artery.  Verapamil, nitroglycerin, cardene were administered through the sheath.  Heparin was administered.  Diagnostic 5fr pigtail, ultra catheters were used for diagnostic angiograms.  Aorta was short and this may coronary cannulation and Left ht cath difficult. Attention turned to PCI as noted below following angio.  At the conclusion of the procedure, a TR band was placed over the puncture site and hemostasis was obtained.  There were no immediate complications. Sedation was provided by anaesthesiology svc, see their notes for those details.  <20cc EBL.      FINDINGS       LVGRAM    LVEDP 9   GRADIENT ACROSS AORTIC VALVE None   LV FUNCTION EF 50-55%   WALL MOTION Grossly normal   MITRAL REGURGITATION Mild     There is brachiocephalic tortuosity and a short aorta.         CORONARY ARTERIES    LM Less than 10% [id number] stenosis         LAD Moderately calcified, prox-mid 80% stenosis.  Distal 50% stenosis.    D1 and D2 have prox-mid 60%

## 2024-12-27 NOTE — PLAN OF CARE
PLAN OF CARE    Received request for inpatient admission. Admitting provider Dr. Reid notified.    EMI CARRILLO MD  12/27/2024  1:21 PM

## 2024-12-27 NOTE — ANESTHESIA POSTPROCEDURE EVALUATION
Department of Anesthesiology  Postprocedure Note    Patient: Arthur Klein  MRN: [id number]  YOB: 1975  Date of evaluation: 12/27/2024    Procedure Summary       Date: 12/27/24 Room / Location: [facility] CATH LAB 1 / [facility] CARDIAC CATH LAB    Anesthesia Start: 1000 Anesthesia Stop:     Procedures:       High risk percutaneous coronary intervention      Coronary angiography      Intravascular ultrasound      Percutaneous coronary intervention Diagnosis:       Atherosclerosis of native coronary artery of native heart, unspecified whether angina present      (Atherosclerosis of native coronary artery of native heart, unspecified whether angina present [I25.10])    Providers: Douglas Gore MD Responsible Provider: Emiliano Beebe MD    Anesthesia Type: general ASA Status: 3            Anesthesia Type: No value filed.    Jeni Phase I: Jeni Score: 9    Jeni Phase II:      Anesthesia Post Evaluation    Patient location during evaluation: PACU  Level of consciousness: awake  Airway patency: patent  Nausea & Vomiting: no vomiting  Cardiovascular status: blood pressure returned to baseline  Respiratory status: acceptable  Hydration status: stable  Pain management: adequate    No notable events documented.

## 2024-12-28 VITALS
HEART RATE: 73 BPM | HEIGHT: 73 IN | TEMPERATURE: 98.4 F | WEIGHT: 248 LBS | OXYGEN SATURATION: 95 % | DIASTOLIC BLOOD PRESSURE: 87 MMHG | BODY MASS INDEX: 32.87 KG/M2 | RESPIRATION RATE: 14 BRPM | SYSTOLIC BLOOD PRESSURE: 136 MMHG

## 2024-12-28 LAB
ANION GAP SERPL CALCULATED.3IONS-SCNC: 10 MMOL/L (ref 3–16)
BUN SERPL-MCNC: 13 MG/DL (ref 7–20)
CALCIUM SERPL-MCNC: 9.2 MG/DL (ref 8.3–10.6)
CHLORIDE SERPL-SCNC: 104 MMOL/L (ref 99–110)
CO2 SERPL-SCNC: 24 MMOL/L (ref 21–32)
CREAT SERPL-MCNC: 0.6 MG/DL (ref 0.9–1.3)
DEPRECATED RDW RBC AUTO: 13.3 % (ref 12.4–15.4)
EKG ATRIAL RATE: 70 BPM
EKG DIAGNOSIS: NORMAL
EKG P AXIS: 27 DEGREES
EKG P-R INTERVAL: 148 MS
EKG Q-T INTERVAL: 404 MS
EKG QRS DURATION: 102 MS
EKG QTC CALCULATION (BAZETT): 436 MS
EKG R AXIS: 11 DEGREES
EKG T AXIS: 7 DEGREES
EKG VENTRICULAR RATE: 70 BPM
GFR SERPLBLD CREATININE-BSD FMLA CKD-EPI: >90 ML/MIN/{1.73_M2}
GLUCOSE SERPL-MCNC: 117 MG/DL (ref 70–99)
HCT VFR BLD AUTO: 41.7 % (ref 40.5–52.5)
HGB BLD-MCNC: 14.6 G/DL (ref 13.5–17.5)
MCH RBC QN AUTO: 29.7 PG (ref 26–34)
MCHC RBC AUTO-ENTMCNC: 34.9 G/DL (ref 31–36)
MCV RBC AUTO: 85.1 FL (ref 80–100)
PLATELET # BLD AUTO: 178 K/UL (ref 135–450)
PMV BLD AUTO: 9.9 FL (ref 5–10.5)
POTASSIUM SERPL-SCNC: 3.9 MMOL/L (ref 3.5–5.1)
RBC # BLD AUTO: 4.9 M/UL (ref 4.2–5.9)
SODIUM SERPL-SCNC: 138 MMOL/L (ref 136–145)
WBC # BLD AUTO: 13.7 K/UL (ref 4–11)

## 2024-12-28 PROCEDURE — G0378 HOSPITAL OBSERVATION PER HR: HCPCS

## 2024-12-28 PROCEDURE — 85027 COMPLETE CBC AUTOMATED: CPT

## 2024-12-28 PROCEDURE — 93010 ELECTROCARDIOGRAM REPORT: CPT | Performed by: INTERNAL MEDICINE

## 2024-12-28 PROCEDURE — 99232 SBSQ HOSP IP/OBS MODERATE 35: CPT

## 2024-12-28 PROCEDURE — 93005 ELECTROCARDIOGRAM TRACING: CPT | Performed by: INTERNAL MEDICINE

## 2024-12-28 PROCEDURE — 80048 BASIC METABOLIC PNL TOTAL CA: CPT

## 2024-12-28 PROCEDURE — 36415 COLL VENOUS BLD VENIPUNCTURE: CPT

## 2024-12-28 NOTE — DISCHARGE SUMMARY
Discharge Summary    Name:  Arthur Klein /Age/Sex: 1975 (49 y.o. male)   Admit Date: 2024  Discharge Date: 24   MRN & CSN:  [id number] & [id number] Encounter Date and Time 24 7:47 AM EST    Attending:  Palomo Reid MD Discharging Provider: PALOMO REID MD       Hospital Course:       The patient is a pleasant 49 Y M with a BMI of 33 and a HTN, and HLD. He never smoked. Doesn't have a strong family h/o heart disease. He developed chest pressure at work in 2024, EKG showed inferior STEMI, trop was 800, and he required emergent DESx3 to RCA. He was also noted to have L-sided disease but this was not intervened upon originally. EF was 50-55%. Outpatient stress test showed ischemia, so he was scheduled for C . The procedure went well. MEHRAN was deployed to the LAD and a septal branch was angioplastied. Of note, cardiology commented that his diags and a septal branch were jailed by the LAD stent but there was still brisk flow. He had no chest pain after the procedure. No dyspnea. R radial site looks good. Cardiology placed him in observation overnight and asked hospital medicine to help manage his care.       CAD.  S/p MEHRAN to the LAD, also angioplasty of the first septal branch.  He did well.  Continue aspirin, ticagrelor, statin, metoprolol.     Mild ischemic cardiomyopathy.  Valsartan, metoprolol.     HLD.  Statin.      Obesity.  Diet and exercise.         Discharge Diagnosis:   Status post cardiac catheterization      Discharge Instructions:     Follow up with PCP within 1-2 weeks.  Follow up with cardiology per their instructions.     Diet: ADULT DIET; Regular  Activity: activity as tolerated  Discharged to:  home  Condition on discharge: Stable    Objective Findings at Discharge:   BP (!) 140/85   Pulse 57   Temp 98.2 °F (36.8 °C)   Resp 18   Ht 1.854 m (6' 1\")   Wt 112.5 kg (248 lb)   SpO2 94%   BMI 32.72 kg/m²       Physical Exam:     General appearance: No

## 2024-12-28 NOTE — PROGRESS NOTES
[facility]     Cardiology                                     Progress Note    Admission date:  2024    Reason for follow up visit: Staged PCI    HPI/CC: Arthur Klein was admitted on 2024 for staged PCI of LAD and PTA of septal.  Of note patient was hospitalized at [facility] 2024 to 2024 with presentation of chest pain and acute NSTEMI.  Patient was taken urgently to the Cath Lab and underwent PCI of RCA.  Patient had stress test 2024 which was most consistent with myocardial ischemia and myocardial infarction.  Patient was planned to have staged PCI of his LAD with Dr Gore.    Rhythm has been SR.     Subjective: Patient denies chest pain, shortness of and palpitations.  Reviewed at length post care instructions.  Patient verbalized understanding.  Will follow-up with interventional NP in 7 to 10 days    Vitals:  Blood pressure 136/87, pulse 73, temperature 98.4 °F (36.9 °C), temperature source Oral, resp. rate 14, height 1.854 m (6' 1\"), weight 112.5 kg (248 lb), SpO2 95%.  Temp  Av °F (36.7 °C)  Min: 97.6 °F (36.4 °C)  Max: 98.4 °F (36.9 °C)  Pulse  Av.1  Min: 56  Max: 77  BP  Min: 109/76  Max: 140/85  SpO2  Av %  Min: 93 %  Max: 100 %    24 hour I/O    Intake/Output Summary (Last 24 hours) at 2024 1035  Last data filed at 2024 1350  Gross per 24 hour   Intake --   Output 660 ml   Net -660 ml     Current Facility-Administered Medications   Medication Dose Route Frequency Provider Last Rate Last Admin    sodium chloride flush 0.9 % injection 5-40 mL  5-40 mL IntraVENous 2 times per day Douglas Gore MD        sodium chloride flush 0.9 % injection 5-40 mL  5-40 mL IntraVENous PRN Douglas Gore MD        0.9 % sodium chloride infusion   IntraVENous PRN Douglas Gore MD        acetaminophen (TYLENOL) tablet 650 mg  650 mg Oral Q4H PRN Douglas Gore MD        aspirin chewable tablet 81 mg  81 mg Oral Daily Ariel Reid MD        metoprolol  updated      Suzi Lema APRN-CNP  [facility]  [phone]

## 2024-12-28 NOTE — PLAN OF CARE
Problem: Discharge Planning  Goal: Discharge to home or other facility with appropriate resources  12/28/2024 0817 by Maximo Beltrán, RN  Outcome: Adequate for Discharge     Problem: ABCDS Injury Assessment  Goal: Absence of physical injury  12/28/2024 0817 by Maximo Beltrán, RN  Outcome: Adequate for Discharge

## 2024-12-29 LAB
POC ACT LR: >400 SEC

## 2025-01-02 ENCOUNTER — TELEPHONE (OUTPATIENT)
Dept: CARDIOLOGY CLINIC | Age: 50
End: 2025-01-02

## 2025-01-02 NOTE — TELEPHONE ENCOUNTER
----- Message from LISA Johnson CNP sent at 12/28/2024 10:45 AM EST -----  Patient needs follow-up post PCI with Abby in 7 to 10 days post discharge from hospital 12/28/2024    Thank you

## 2025-01-03 NOTE — PROGRESS NOTES
[facility]  Office Visit    Arthur Klein  1975 January 7, 2025    CC:   Chief Complaint   Patient presents with    Follow-up    Coronary Artery Disease     HPI:  The patient is 49 y.o. male with a past medical history notable for STEMI/Ischemic cardiomyopathy, CAD, HLP who is here for hospital follow up. He was hospitalized at [facility] from 12/27/24-12/28/24 after undergoing planned cardiac catheterization d/t an abnormal stress test performed in August 2024. He had been followed in the office and last seen in November 2024. At that time I discussed cardiac PCI with him given his stress test results, but he did not want to pursue any further invasive testing. Later in November 2024 he called the office and expressed that he wanted to pursue PCI. On 12/27/24 he underwent PCI of LAD with MEHRAN and PTA of septal branch. He is here for follow up from this procedure.     He was hospitalized at [facility] from 7/6/24-7/8/24 after presenting with chest pain and acute STEMI. ECG showed inferior ST elevations with lateral depressions. He was taken urgently to cath lab and treated with PCI of RCA. He had residual left side disease and seen by Dr. Gore who opted to continue medical management as vessel was somewhat small and wanted to consider outpatient stress testing and consider staged PCI. Echo showed LVEF of  50-55%. Placed on GDMT and discharged with follow up. He underwent stress testing in August 2024 which demonstrated reversible ischemia.  He was last seen in September 2024 with Dr. Gore  and he was asked to maintain BP log, [facility] recommended d/t abnormal stress test and he wanted to continue medical management.  He was referred for cardiac rehab.     Overall feeling okay. Denies chest pain/discomfort, SOB, orthopnea/PND, cough, palpitations, dizziness, syncope, edema , weight change or claudication. Has remained very active around the house (up and down multiple flights of stairs).        Review of

## 2025-01-07 ENCOUNTER — OFFICE VISIT (OUTPATIENT)
Dept: CARDIOLOGY CLINIC | Age: 50
End: 2025-01-07
Payer: COMMERCIAL

## 2025-01-07 VITALS
BODY MASS INDEX: 33.27 KG/M2 | OXYGEN SATURATION: 95 % | SYSTOLIC BLOOD PRESSURE: 104 MMHG | DIASTOLIC BLOOD PRESSURE: 60 MMHG | WEIGHT: 251 LBS | HEART RATE: 77 BPM | HEIGHT: 73 IN

## 2025-01-07 DIAGNOSIS — I25.2 HISTORY OF ST ELEVATION MYOCARDIAL INFARCTION (STEMI): ICD-10-CM

## 2025-01-07 DIAGNOSIS — E78.2 MIXED HYPERLIPIDEMIA: ICD-10-CM

## 2025-01-07 DIAGNOSIS — I21.19 ST-SEGMENT ELEVATION MYOCARDIAL INFARCTION (STEMI) OF INFERIOR WALL (HCC): ICD-10-CM

## 2025-01-07 DIAGNOSIS — Z95.5 S/P DRUG ELUTING CORONARY STENT PLACEMENT: ICD-10-CM

## 2025-01-07 DIAGNOSIS — I25.5 ISCHEMIC CARDIOMYOPATHY: ICD-10-CM

## 2025-01-07 DIAGNOSIS — I25.10 CAD IN NATIVE ARTERY: Primary | ICD-10-CM

## 2025-01-07 PROCEDURE — 1036F TOBACCO NON-USER: CPT | Performed by: NURSE PRACTITIONER

## 2025-01-07 PROCEDURE — 99214 OFFICE O/P EST MOD 30 MIN: CPT | Performed by: NURSE PRACTITIONER

## 2025-01-07 PROCEDURE — 3017F COLORECTAL CA SCREEN DOC REV: CPT | Performed by: NURSE PRACTITIONER

## 2025-01-07 PROCEDURE — G8417 CALC BMI ABV UP PARAM F/U: HCPCS | Performed by: NURSE PRACTITIONER

## 2025-01-07 PROCEDURE — G8427 DOCREV CUR MEDS BY ELIG CLIN: HCPCS | Performed by: NURSE PRACTITIONER

## 2025-01-07 RX ORDER — ROSUVASTATIN CALCIUM 40 MG/1
40 TABLET, COATED ORAL NIGHTLY
Qty: 90 TABLET | Refills: 2
Start: 2025-01-07

## 2025-01-07 NOTE — PATIENT INSTRUCTIONS
Continue same medications but increase rosuvastatin to 40 mg daily    Lab work: Lipids and AST/ALT (liver blood test): can be done at any lab and are fasting

## 2025-05-07 DIAGNOSIS — E78.2 MIXED HYPERLIPIDEMIA: ICD-10-CM

## 2025-05-07 DIAGNOSIS — I21.19 ST-SEGMENT ELEVATION MYOCARDIAL INFARCTION (STEMI) OF INFERIOR WALL (HCC): ICD-10-CM

## 2025-05-07 RX ORDER — ROSUVASTATIN CALCIUM 40 MG/1
40 TABLET, COATED ORAL NIGHTLY
Qty: 90 TABLET | Refills: 2 | Status: SHIPPED | OUTPATIENT
Start: 2025-05-07

## 2025-05-07 NOTE — TELEPHONE ENCOUNTER
Last Office Visit: 1/7/2025 Provider: VITA  **Is provider OOT? No    Next Office Visit: 8/27/2025 [facility]    LAST LABS:   CMP:  Lab Results   Component Value Date     12/28/2024    K 3.9 12/28/2024     12/28/2024    CO2 24 12/28/2024    BUN 13 12/28/2024    CREATININE 0.6 (L) 12/28/2024    GLUCOSE 117 (H) 12/28/2024    CALCIUM 9.2 12/28/2024    BILITOT 0.3 07/07/2024    ALKPHOS 59 07/07/2024    AST 45 (H) 07/07/2024    ALT 32 07/07/2024    LABGLOM >90 12/28/2024    AGRATIO 1.4 07/07/2024         Lipid:  Lab Results   Component Value Date    CHOL 143 12/27/2024    TRIG 108 12/27/2024    HDL 40 12/27/2024    LDL 81 12/27/2024    VLDL 22 12/27/2024     Lab orders needed? no

## 2025-05-07 NOTE — TELEPHONE ENCOUNTER
Pt is about to run out of his rosuvastatin (CRESTOR) 20 MG tablet.  Pt is supposed to get a refill for 40mg this time since [facility] changed the dosage at the LOV. The new prescription is already in chart, looks like it just needs to be sent to the pharmacy. Please advise. Thank you!    Medication: rosuvastatin (CRESTOR) 40 MG tablet [[id number]]     Pharmacy: [facility] [id number] - [id number] -  [phone] -  [phone]

## 2025-06-02 DIAGNOSIS — I25.5 ISCHEMIC CARDIOMYOPATHY: ICD-10-CM

## 2025-06-02 DIAGNOSIS — I21.19 ST-SEGMENT ELEVATION MYOCARDIAL INFARCTION (STEMI) OF INFERIOR WALL (HCC): ICD-10-CM

## 2025-06-02 RX ORDER — METOPROLOL SUCCINATE 25 MG/1
25 TABLET, EXTENDED RELEASE ORAL DAILY
Qty: 90 TABLET | Refills: 1 | Status: SHIPPED | OUTPATIENT
Start: 2025-06-02

## 2025-08-05 DIAGNOSIS — I25.5 ISCHEMIC CARDIOMYOPATHY: ICD-10-CM

## 2025-08-05 DIAGNOSIS — I21.19 ST-SEGMENT ELEVATION MYOCARDIAL INFARCTION (STEMI) OF INFERIOR WALL (HCC): ICD-10-CM

## 2025-08-05 RX ORDER — VALSARTAN 80 MG/1
80 TABLET ORAL DAILY
Qty: 90 TABLET | Refills: 1 | Status: SHIPPED | OUTPATIENT
Start: 2025-08-05

## 2025-08-12 DIAGNOSIS — I21.19 ST-SEGMENT ELEVATION MYOCARDIAL INFARCTION (STEMI) OF INFERIOR WALL (HCC): ICD-10-CM

## 2025-08-12 RX ORDER — TICAGRELOR 90 MG/1
90 TABLET, FILM COATED ORAL 2 TIMES DAILY
Qty: 180 TABLET | Refills: 2 | Status: SHIPPED | OUTPATIENT
Start: 2025-08-12

## 2025-08-26 ENCOUNTER — TELEPHONE (OUTPATIENT)
Dept: CARDIOLOGY CLINIC | Age: 50
End: 2025-08-26

## 2025-08-27 ENCOUNTER — OFFICE VISIT (OUTPATIENT)
Age: 50
End: 2025-08-27
Payer: COMMERCIAL

## 2025-08-27 VITALS
HEIGHT: 73 IN | SYSTOLIC BLOOD PRESSURE: 122 MMHG | OXYGEN SATURATION: 95 % | WEIGHT: 251 LBS | DIASTOLIC BLOOD PRESSURE: 76 MMHG | HEART RATE: 78 BPM | BODY MASS INDEX: 33.27 KG/M2

## 2025-08-27 DIAGNOSIS — I25.5 ISCHEMIC CARDIOMYOPATHY: ICD-10-CM

## 2025-08-27 DIAGNOSIS — I21.19 ST-SEGMENT ELEVATION MYOCARDIAL INFARCTION (STEMI) OF INFERIOR WALL (HCC): ICD-10-CM

## 2025-08-27 DIAGNOSIS — R94.39 ABNORMAL STRESS TEST: ICD-10-CM

## 2025-08-27 DIAGNOSIS — E78.2 MIXED HYPERLIPIDEMIA: ICD-10-CM

## 2025-08-27 DIAGNOSIS — Z98.890 STATUS POST CARDIAC CATHETERIZATION: ICD-10-CM

## 2025-08-27 DIAGNOSIS — I25.10 CORONARY ARTERY DISEASE INVOLVING NATIVE CORONARY ARTERY OF NATIVE HEART WITHOUT ANGINA PECTORIS: Primary | ICD-10-CM

## 2025-08-27 PROCEDURE — G8427 DOCREV CUR MEDS BY ELIG CLIN: HCPCS | Performed by: INTERNAL MEDICINE

## 2025-08-27 PROCEDURE — 99214 OFFICE O/P EST MOD 30 MIN: CPT | Performed by: INTERNAL MEDICINE

## 2025-08-27 PROCEDURE — 1036F TOBACCO NON-USER: CPT | Performed by: INTERNAL MEDICINE

## 2025-08-27 PROCEDURE — G8417 CALC BMI ABV UP PARAM F/U: HCPCS | Performed by: INTERNAL MEDICINE

## 2025-08-27 PROCEDURE — 3017F COLORECTAL CA SCREEN DOC REV: CPT | Performed by: INTERNAL MEDICINE

## 2025-08-27 RX ORDER — CLOPIDOGREL BISULFATE 75 MG/1
75 TABLET ORAL DAILY
Qty: 90 TABLET | Refills: 3 | Status: SHIPPED | OUTPATIENT
Start: 2025-08-27

## (undated) DEVICE — CATH BLLN ANGIO 2.75X20MM SC EUPHORA RX

## (undated) DEVICE — DISPOSABLE PULLBACK SLED FOR MOTORDRIVE

## (undated) DEVICE — GLIDESHEATH SLENDER ACCESS KIT: Brand: GLIDESHEATH SLENDER

## (undated) DEVICE — CATHETER ANGIO 3DRC 0.045 INX5 FRX100 CM THRULUMEN INFINITI

## (undated) DEVICE — SHIELD RAD ANGIO FEM ENTRY W/ ABSORBENT ORNG STRL RADPAD LTX

## (undated) DEVICE — CATHETER INTVENT OTW 0.014 IN 130 CM COILED SHFT SUPERCROSS

## (undated) DEVICE — 6FR CORDIS JR4 GUIDE 100CM

## (undated) DEVICE — Device: Brand: PROWATER

## (undated) DEVICE — MICROSURGICAL DILATATION DEVICE: Brand: WOLVERINE CORONARY CUTTING BALLOON

## (undated) DEVICE — CATHETER GUID AD 6FR L100CM GRN NYL PTFE 3DRC WLLMS TECH

## (undated) DEVICE — TR BAND RADIAL ARTERY COMPRESSION DEVICE: Brand: TR BAND

## (undated) DEVICE — CORDIS PIG 145 110CM CATHETER

## (undated) DEVICE — 260 CM J TIP WIRE .035

## (undated) DEVICE — GUIDE CATHETER: Brand: MACH1™

## (undated) DEVICE — GLIDESHEATH SLENDER STAINLESS STEEL KIT: Brand: GLIDESHEATH SLENDER

## (undated) DEVICE — COPILOT BLEEDBACK CONTROL VALVE: Brand: COPILOT

## (undated) DEVICE — Device: Brand: ASAHI SASUKE

## (undated) DEVICE — CORONARY IMAGING CATHETER: Brand: OPTICROSS™ 6 HD

## (undated) DEVICE — Device: Brand: ASAHI SION BLACK

## (undated) DEVICE — CATHETER ANGIOPLSTY RX 2.7 FRX139 CM 2.5X15 MM SCOREFLEX NC

## (undated) DEVICE — CATHETER COR DIAG PIGTAILS PIG 145 CRV 5FR 110CM 6 SIDE H

## (undated) DEVICE — SOLUTION IV HEPARIN SODIUM SODIUM CHL 0.9% 500 ML INJ VIAFLX

## (undated) DEVICE — 6FR JL3.5 CORDIS DIAG CATHETER 100CM

## (undated) DEVICE — PTCA DILATATION CATHETER: Brand: NC EMERGE®

## (undated) DEVICE — EXCHANGE DEVICE: Brand: TRAPPER™

## (undated) DEVICE — CATH LAB PACK: Brand: MEDLINE INDUSTRIES, INC.

## (undated) DEVICE — CATH BLLN ANGIO 3.50X20MM NC EUPHORIA RX

## (undated) DEVICE — GUIDEWIRE VASC L260CM DIA0.035IN RAD 3MM J TIP L7CM PTFE

## (undated) DEVICE — GUIDEWIRE WITH ICE™ HYDROPHILIC COATING: Brand: CHOICE™

## (undated) DEVICE — VALVE HEMSTAT 8FR INNR LUMN CRSS SLT SEAL DSGN WATCHDOG

## (undated) DEVICE — CATH BLLN ANGIO 3X20MM NC EUPHORIA RX

## (undated) DEVICE — Device: Brand: ASAHI SION BLUE

## (undated) DEVICE — GUIDEWIRE VASC L260CM DIA0.035IN STR TIP FLPY PROF WHLY

## (undated) DEVICE — CATHETER COR DIAG 4.0 5FR 100CM 2 SIDE H

## (undated) DEVICE — DEVICE INFL W/ HEM VLV TORQ

## (undated) DEVICE — MEDTRONIC EUPHORA 2.0X20MM BALLOON

## (undated) DEVICE — Device

## (undated) DEVICE — ISOVUE 370 50ML

## (undated) DEVICE — CATHETER DIAG 2.4X3.2FR L130CM ID0.017X0.018IN 90DEG 0.014IN